# Patient Record
Sex: FEMALE | Race: WHITE | Employment: FULL TIME | URBAN - METROPOLITAN AREA
[De-identification: names, ages, dates, MRNs, and addresses within clinical notes are randomized per-mention and may not be internally consistent; named-entity substitution may affect disease eponyms.]

---

## 2018-04-28 ENCOUNTER — OFFICE VISIT (OUTPATIENT)
Dept: URGENT CARE | Facility: URGENT CARE | Age: 28
End: 2018-04-28
Payer: MEDICAID

## 2018-04-28 VITALS
HEART RATE: 65 BPM | OXYGEN SATURATION: 98 % | WEIGHT: 246 LBS | DIASTOLIC BLOOD PRESSURE: 70 MMHG | TEMPERATURE: 96.5 F | SYSTOLIC BLOOD PRESSURE: 122 MMHG

## 2018-04-28 DIAGNOSIS — M89.8X6 PAIN OF LEFT TIBIA: Primary | ICD-10-CM

## 2018-04-28 PROCEDURE — 99203 OFFICE O/P NEW LOW 30 MIN: CPT | Performed by: PHYSICIAN ASSISTANT

## 2018-04-28 NOTE — NURSING NOTE
Chief Complaint   Patient presents with     Urgent Care     Patient inured leg on exercise machine on 4/23-patient states her ankle was bruised and it is hard to put weight on ankle/leg. Patient also states that she feels numbness in the foot when standing on it and she also feels a bone might be out of place.       Initial /70  Pulse 65  Temp 96.5  F (35.8  C) (Tympanic)  Wt 246 lb (111.6 kg)  LMP 03/04/2018 (Approximate)  SpO2 98% There is no height or weight on file to calculate BMI.  Medication Reconciliation: incomplete   Caridad Laguerre CMA (AAMA)

## 2018-04-28 NOTE — PROGRESS NOTES
SUBJECTIVE:  Chief Complaint   Patient presents with     Urgent Care     Patient inured leg on exercise machine on 4/23-patient states her ankle was bruised and it is hard to put weight on ankle/leg. Patient also states that she feels numbness in the foot when standing on it and she also feels a bone might be out of place.     Yu Machado is a 27 year old female presents with a chief complaint of left ankle and shin pain.  The injury occurred 5 day(s) ago.   The injury happened while exercising. How: banged left shin immediate pain.  The patient complained of moderate pain  and has not had decreased ROM. Intensity of pain 6/10.  Pain exacerbated by walking, weight-bearing and movement.  Relieved by rest.  She treated it initially with ice and Tylenol. This is the first time this type of injury has occurred to this patient. History of chronic numbness to left foot and had back surgery where she has chronic pain. Still has lower back pain. Moved here from [place] but has not established yet.     No past medical history on file.  No current outpatient prescriptions on file.     Social History   Substance Use Topics     Smoking status: Not on file     Smokeless tobacco: Not on file     Alcohol use Not on file       ROS:  CONSTITUTIONAL:NEGATIVE for fever, chills, change in weight  MUSCULOSKELETAL: myalgia    EXAM:   /70  Pulse 65  Temp 96.5  F (35.8  C) (Tympanic)  Wt 246 lb (111.6 kg)  LMP 03/04/2018 (Approximate)  SpO2 98%  Gen: healthy,alert,no distress  Extremity: leg has point tenderness mid left shin.   There is not compromise to the distal circulation.  Pulses are +2 and CRT is brisk  GENERAL APPEARANCE: healthy, alert and no distress  EXTREMITIES: peripheral pulses normal  SKIN: ecchymoses - left mid shin  NEURO: Normal strength and tone, sensory exam grossly normal, mentation intact and speech normal    X-RAY was not done.    ASSESSMENT:     1. Pain of left tibia    PLAN:  1) Rest, Ice,  Compress, Elevate and acetaminophen as needed. Letter for work given. Follow up next week in clinic to establish.

## 2018-04-28 NOTE — MR AVS SNAPSHOT
"              After Visit Summary   2018    Yu Machado    MRN: [id number]           Patient Information     Date Of Birth          1990        Visit Information        Provider Department      2018 10:55 AM Ricky Olvera PA-C [facility] [place] [facility]        Today's Diagnoses     Pain of left tibia    -  1       Follow-ups after your visit        Who to contact     If you have questions or need follow up information about today's clinic visit or your schedule please contact [facility] directly at [phone].  Normal or non-critical lab and imaging results will be communicated to you by [employer], letter or phone within 4 business days after the clinic has received the results. If you do not hear from us within 7 days, please contact the clinic through [employer] or phone. If you have a critical or abnormal lab result, we will notify you by phone as soon as possible.  Submit refill requests through [employer] or call your pharmacy and they will forward the refill request to us. Please allow 3 business days for your refill to be completed.          Additional Information About Your Visit        MyChart Information     [employer] lets you send messages to your doctor, view your test results, renew your prescriptions, schedule appointments and more. To sign up, go to [email or web address] . Click on \"Log in\" on the left side of the screen, which will take you to the Welcome page. Then click on \"Sign up Now\" on the right side of the page.     You will be asked to enter the access code listed below, as well as some personal information. Please follow the directions to create your username and password.     Your access code is: [email or web address]  Expires: 2018 11:45 AM     Your access code will  in 90 days. If you need help or a new code, please call your [place] clinic or [phone].        Care EveryWhere ID     This is your Care EveryWhere ID. This could be used by other " organizations to access your [place] medical records  [id number]        Your Vitals Were     Pulse Temperature Last Period Pulse Oximetry          65 96.5  F (35.8  C) (Tympanic) 03/04/2018 (Approximate) 98%         Blood Pressure from Last 3 Encounters:   04/28/18 122/70    Weight from Last 3 Encounters:   04/28/18 246 lb (111.6 kg)              Today, you had the following     No orders found for display       Primary Care Provider Fax #    Physician No Ref-Primary [phone]       No address on file        Equal Access to Services     LITZY PORTER : Hadii aad ku hadasho Soomaali, waaxda luqadaha, qaybta kaalmada adeegyada, teodoro melissain hayfrancon blair jaimes . So [facility] [phone].    ATENCIÓN: Si habla español, tiene a mckeon disposición servicios gratuitos de asistencia lingüística. Llame al [phone].    We comply with applicable federal civil rights laws and [place] laws. We do not discriminate on the basis of race, color, national origin, age, disability, sex, sexual orientation, or gender identity.            Thank you!     Thank you for choosing [facility]  for your care. Our goal is always to provide you with excellent care. Hearing back from our patients is one way we can continue to improve our services. Please take a few minutes to complete the written survey that you may receive in the mail after your visit with us. Thank you!             Your Updated Medication List - Protect others around you: Learn how to safely use, store and throw away your medicines at www.disposemymeds.org.      Notice  As of 4/28/2018 11:45 AM    You have not been prescribed any medications.

## 2018-04-28 NOTE — LETTER
[facility]  [address]  [address]  [place] [address]  Phone: [phone]  Fax: [phone]    April 28, 2018        Yu D Machado  [address]  [place] [address]          To whom it may concern:    RE: Yu LEE Machado    Patient was seen and treated today at our clinic and missed work.    Please contact me for questions or concerns.      Sincerely,        Ricky Olvera PA-C

## 2018-05-14 ENCOUNTER — HOSPITAL ENCOUNTER (EMERGENCY)
Facility: CLINIC | Age: 28
Discharge: HOME | End: 2018-05-15
Attending: EMERGENCY MEDICINE | Admitting: EMERGENCY MEDICINE
Payer: MEDICAID

## 2018-05-14 DIAGNOSIS — R10.31 ABDOMINAL PAIN, RIGHT LOWER QUADRANT: ICD-10-CM

## 2018-05-14 LAB
ALBUMIN UR-MCNC: 30 MG/DL
APPEARANCE UR: CLEAR
BASOPHILS # BLD AUTO: 0.1 10E9/L (ref 0–0.2)
BASOPHILS NFR BLD AUTO: 0.7 %
BILIRUB UR QL STRIP: NEGATIVE
COLOR UR AUTO: YELLOW
DIFFERENTIAL METHOD BLD: NORMAL
EOSINOPHIL # BLD AUTO: 0.1 10E9/L (ref 0–0.7)
EOSINOPHIL NFR BLD AUTO: 1.5 %
ERYTHROCYTE [DISTWIDTH] IN BLOOD BY AUTOMATED COUNT: 12.9 % (ref 10–15)
GLUCOSE UR STRIP-MCNC: NEGATIVE MG/DL
HCG UR QL: NEGATIVE
HCT VFR BLD AUTO: 43.9 % (ref 35–47)
HGB BLD-MCNC: 14.4 G/DL (ref 11.7–15.7)
HGB UR QL STRIP: NEGATIVE
IMM GRANULOCYTES # BLD: 0 10E9/L (ref 0–0.4)
IMM GRANULOCYTES NFR BLD: 0.2 %
KETONES UR STRIP-MCNC: NEGATIVE MG/DL
LEUKOCYTE ESTERASE UR QL STRIP: ABNORMAL
LYMPHOCYTES # BLD AUTO: 2.7 10E9/L (ref 0.8–5.3)
LYMPHOCYTES NFR BLD AUTO: 29.8 %
MCH RBC QN AUTO: 28.6 PG (ref 26.5–33)
MCHC RBC AUTO-ENTMCNC: 32.8 G/DL (ref 31.5–36.5)
MCV RBC AUTO: 87 FL (ref 78–100)
MONOCYTES # BLD AUTO: 0.8 10E9/L (ref 0–1.3)
MONOCYTES NFR BLD AUTO: 8.9 %
MUCOUS THREADS #/AREA URNS LPF: PRESENT /LPF
NEUTROPHILS # BLD AUTO: 5.4 10E9/L (ref 1.6–8.3)
NEUTROPHILS NFR BLD AUTO: 58.9 %
NITRATE UR QL: NEGATIVE
NRBC # BLD AUTO: 0 10*3/UL
NRBC BLD AUTO-RTO: 0 /100
PH UR STRIP: 5 PH (ref 5–7)
PLATELET # BLD AUTO: 271 10E9/L (ref 150–450)
RBC # BLD AUTO: 5.03 10E12/L (ref 3.8–5.2)
RBC #/AREA URNS AUTO: 0 /HPF (ref 0–2)
SOURCE: ABNORMAL
SP GR UR STRIP: 1.02 (ref 1–1.03)
SQUAMOUS #/AREA URNS AUTO: 4 /HPF (ref 0–1)
UROBILINOGEN UR STRIP-MCNC: 0 MG/DL (ref 0–2)
WBC # BLD AUTO: 9.1 10E9/L (ref 4–11)
WBC #/AREA URNS AUTO: 1 /HPF (ref 0–5)

## 2018-05-14 PROCEDURE — 81025 URINE PREGNANCY TEST: CPT | Performed by: EMERGENCY MEDICINE

## 2018-05-14 PROCEDURE — 99285 EMERGENCY DEPT VISIT HI MDM: CPT | Mod: 25

## 2018-05-14 PROCEDURE — 81001 URINALYSIS AUTO W/SCOPE: CPT | Performed by: EMERGENCY MEDICINE

## 2018-05-14 PROCEDURE — 83690 ASSAY OF LIPASE: CPT | Performed by: EMERGENCY MEDICINE

## 2018-05-14 PROCEDURE — 96375 TX/PRO/DX INJ NEW DRUG ADDON: CPT

## 2018-05-14 PROCEDURE — 80053 COMPREHEN METABOLIC PANEL: CPT | Performed by: EMERGENCY MEDICINE

## 2018-05-14 PROCEDURE — 96376 TX/PRO/DX INJ SAME DRUG ADON: CPT

## 2018-05-14 PROCEDURE — 85025 COMPLETE CBC W/AUTO DIFF WBC: CPT | Performed by: EMERGENCY MEDICINE

## 2018-05-14 PROCEDURE — 96374 THER/PROPH/DIAG INJ IV PUSH: CPT

## 2018-05-14 PROCEDURE — 96361 HYDRATE IV INFUSION ADD-ON: CPT

## 2018-05-14 PROCEDURE — 25000128 H RX IP 250 OP 636: Performed by: EMERGENCY MEDICINE

## 2018-05-14 RX ORDER — MORPHINE SULFATE 4 MG/ML
4 INJECTION, SOLUTION INTRAMUSCULAR; INTRAVENOUS
Status: DISCONTINUED | OUTPATIENT
Start: 2018-05-14 | End: 2018-05-15 | Stop reason: HOSPADM

## 2018-05-14 RX ORDER — KETOROLAC TROMETHAMINE 15 MG/ML
15 INJECTION, SOLUTION INTRAMUSCULAR; INTRAVENOUS ONCE
Status: COMPLETED | OUTPATIENT
Start: 2018-05-14 | End: 2018-05-14

## 2018-05-14 RX ORDER — ONDANSETRON 2 MG/ML
4 INJECTION INTRAMUSCULAR; INTRAVENOUS
Status: COMPLETED | OUTPATIENT
Start: 2018-05-14 | End: 2018-05-14

## 2018-05-14 RX ADMIN — ONDANSETRON 4 MG: 2 INJECTION INTRAMUSCULAR; INTRAVENOUS at 23:29

## 2018-05-14 RX ADMIN — SODIUM CHLORIDE 1000 ML: 9 INJECTION, SOLUTION INTRAVENOUS at 23:32

## 2018-05-14 RX ADMIN — KETOROLAC TROMETHAMINE 15 MG: 15 INJECTION, SOLUTION INTRAMUSCULAR; INTRAVENOUS at 23:31

## 2018-05-14 RX ADMIN — MORPHINE SULFATE 4 MG: 4 INJECTION INTRAVENOUS at 23:34

## 2018-05-14 NOTE — ED AVS SNAPSHOT
[facility] Emergency Department    [address] [place] [address]    Phone:  [phone]    Fax:  [phone]                                       Yu Machado   MRN: [id number]    Department:  [facility] Emergency Department   Date of Visit:  5/14/2018           Patient Information     Date Of Birth          1990        Your diagnoses for this visit were:     Abdominal pain, right lower quadrant        You were seen by Jeremy Campa MD.      Follow-up Information     Follow up with PCP in 1-2 days for recheck.        Discharge Instructions       Discharge Instructions  Abdominal Pain    Abdominal pain (belly pain) can be caused by many things. Your evaluation today does not show the exact cause for your pain. Your provider today has decided that it is unlikely your pain is due to a life threatening problem, or a problem requiring surgery or hospital admission. Sometimes those problems cannot be found right away, so it is very important that you follow up as directed.  Sometimes only the changes which occur over time allow the cause of your pain to be found.    Generally, every Emergency Department visit should have a follow-up clinic visit with either a primary or a specialty clinic/provider. Please follow-up as instructed by your emergency provider today. With abdominal pain, we often recommend very close follow-up, such as the following day.    ADULTS:  Return to the Emergency Department right away if:      You get an oral temperature above 102oF or as directed by your provider.    You have blood in your stools. This may be bright red or appear as black, tarry stools.      You keep vomiting (throwing up) or cannot drink liquids.    You see blood when you vomit.     You cannot have a bowel movement or you cannot pass gas.    Your stomach gets bloated or bigger.    Your skin or the whites of your eyes look yellow.    You faint.    You have bloody, frequent  "or painful urination (peeing).    You have new symptoms or anything that worries you.    MORE INFORMATION:    Appendicitis:  A possible cause of abdominal pain in any person who still has their appendix is acute appendicitis. Appendicitis is often hard to diagnose.  Testing does not always rule out early appendicitis or other causes of abdominal pain. Close follow-up with your provider and re-evaluations may be needed to figure out the reason for your abdominal pain.    Follow-up:  It is very important that you make an appointment with your clinic and go to the appointment.  If you do not follow-up with your primary provider, it may result in missing an important development which could result in permanent injury or disability and/or lasting pain.  If there is any problem keeping your appointment, call your provider or return to the Emergency Department.    Medications:  Take your medications as directed by your provider today.  Before using over-the-counter medications, ask your provider and make sure to take the medications as directed.  If you have any questions about medications, ask your provider.    Diet:  Resume your normal diet as much as possible, but do not eat fried, fatty or spicy foods while you have pain.  Do not drink alcohol or have caffeine.  Do not smoke tobacco.    Probiotics: If you have been given an antibiotic, you may want to also take a probiotic pill or eat yogurt with live cultures. Probiotics have \"good bacteria\" to help your intestines stay healthy. Studies have shown that probiotics help prevent diarrhea (loose stools) and other intestine problems (including C. diff infection) when you take antibiotics. You can buy these without a prescription in the pharmacy section of the store.     If you were given a prescription for medicine here today, be sure to read all of the information (including the package insert) that comes with your prescription.  This will include important information " about the medicine, its side effects, and any warnings that you need to know about.  The pharmacist who fills the prescription can provide more information and answer questions you may have about the medicine.  If you have questions or concerns that the pharmacist cannot address, please call or return to the Emergency Department.       Remember that you can always come back to the Emergency Department if you are not able to see your regular provider in the amount of time listed above, if you get any new symptoms, or if there is anything that worries you.      24 Hour Appointment Hotline       To make an appointment at any [facility], call [phone] ([phone]). If you don't have a family doctor or clinic, we will help you find one. [facility] clinics are conveniently located to serve the needs of you and your family.             Review of your medicines      Notice     You have not been prescribed any medications.            Procedures and tests performed during your visit     CBC with platelets differential    Comprehensive metabolic panel    HCG qualitative urine    Lipase    UA with Microscopic    US Pelvic Complete w Transvaginal & Abd/Pel Duplex Limited      Orders Needing Specimen Collection     None      Pending Results     No orders found for last 3 day(s).            Pending Culture Results     No orders found for last 3 day(s).            Pending Results Instructions     If you had any lab results that were not finalized at the time of your Discharge, you can call the ED Lab Result RN at [phone]. You will be contacted by this team for any positive Lab results or changes in treatment. The nurses are available 7 days a week from 10A to 6:30P.  You can leave a message 24 hours per day and they will return your call.        Test Results From Your Hospital Stay        5/14/2018 11:49 PM      Component Results     Component Value Ref Range & Units Status    HCG Qual Urine Negative NEG^Negative  Final    This test is for screening purposes.  Results should be interpreted along with   the clinical picture.  Confirmation testing is available if warranted by   ordering [id number], HCG Quantitative Pregnancy.           5/14/2018 11:38 PM      Component Results     Component Value Ref Range & Units Status    WBC 9.1 4.0 - 11.0 10e9/L Final    RBC Count 5.03 3.8 - 5.2 10e12/L Final    Hemoglobin 14.4 11.7 - 15.7 g/dL Final    Hematocrit 43.9 35.0 - 47.0 % Final    MCV 87 78 - 100 fl Final    MCH 28.6 26.5 - 33.0 pg Final    MCHC 32.8 31.5 - 36.5 g/dL Final    RDW 12.9 10.0 - 15.0 % Final    Platelet Count 271 150 - 450 10e9/L Final    Diff Method Automated Method  Final    % Neutrophils 58.9 % Final    % Lymphocytes 29.8 % Final    % Monocytes 8.9 % Final    % Eosinophils 1.5 % Final    % Basophils 0.7 % Final    % Immature Granulocytes 0.2 % Final    Nucleated RBCs 0 0 /100 Final    Absolute Neutrophil 5.4 1.6 - 8.3 10e9/L Final    Absolute Lymphocytes 2.7 0.8 - 5.3 10e9/L Final    Absolute Monocytes 0.8 0.0 - 1.3 10e9/L Final    Absolute Eosinophils 0.1 0.0 - 0.7 10e9/L Final    Absolute Basophils 0.1 0.0 - 0.2 10e9/L Final    Abs Immature Granulocytes 0.0 0 - 0.4 10e9/L Final    Absolute Nucleated RBC 0.0  Final         5/15/2018 12:03 AM      Component Results     Component Value Ref Range & Units Status    Sodium 136 133 - 144 mmol/L Final    Potassium 4.5 3.4 - 5.3 mmol/L Final    Specimen slightly hemolyzed, potassium may be falsely elevated    Chloride 106 94 - 109 mmol/L Final    Carbon Dioxide 25 20 - 32 mmol/L Final    Anion Gap 5 3 - 14 mmol/L Final    Glucose 87 70 - 99 mg/dL Final    Urea Nitrogen 15 7 - 30 mg/dL Final    Creatinine 0.70 0.52 - 1.04 mg/dL Final    GFR Estimate >90 >60 mL/min/1.7m2 Final    Non  GFR Calc    GFR Estimate If Black >90 >60 mL/min/1.7m2 Final    African American GFR Calc    Calcium 9.6 8.5 - 10.1 mg/dL Final    Bilirubin Total 0.5 0.2 - 1.3 mg/dL Final     Albumin 4.1 3.4 - 5.0 g/dL Final    Protein Total 8.6 6.8 - 8.8 g/dL Final    Alkaline Phosphatase 59 40 - 150 U/L Final    ALT 46 0 - 50 U/L Final    AST 31 0 - 45 U/L Final    Specimen is hemolyzed which can falsely elevate AST. Analysis of a   non-hemolyzed specimen may result in a lower value.           5/15/2018 12:03 AM      Component Results     Component Value Ref Range & Units Status    Lipase 116 73 - 393 U/L Final         5/14/2018 11:52 PM      Component Results     Component Value Ref Range & Units Status    Color Urine Yellow  Final    Appearance Urine Clear  Final    Glucose Urine Negative NEG^Negative mg/dL Final    Bilirubin Urine Negative NEG^Negative Final    Ketones Urine Negative NEG^Negative mg/dL Final    Specific Gravity Urine 1.023 1.003 - 1.035 Final    Blood Urine Negative NEG^Negative Final    pH Urine 5.0 5.0 - 7.0 pH Final    Protein Albumin Urine 30 (A) NEG^Negative mg/dL Final    Urobilinogen mg/dL 0.0 0.0 - 2.0 mg/dL Final    Nitrite Urine Negative NEG^Negative Final    Leukocyte Esterase Urine Trace (A) NEG^Negative Final    Source Midstream Urine  Final    WBC Urine 1 0 - 5 /HPF Final    RBC Urine 0 0 - 2 /HPF Final    Squamous Epithelial /HPF Urine 4 (H) 0 - 1 /HPF Final    Mucous Urine Present (A) NEG^Negative /LPF Final         5/15/2018  1:28 AM      Narrative     US PELVIS COMPLETE W TRANSVAGINAL AND DOPPLER LIMITED  5/15/2018 1:02  AM     INDICATION: Pelvic pain - right.    COMPARISON: None.    FINDINGS: Transabdominal followed by endovaginal ultrasound to better  evaluate ovaries. Uterus measures 6.6 x 3.5 x 4.9 cm. Endometrial  stripe measures 0.7 cm in thickness. No uterine mass. Both ovaries are  identified and within normal limits. No suspicious ovarian or adnexal  masses. Doppler color and waveform analysis demonstrates blood flow to  both ovaries. Trace free fluid. Otherwise negative.        Impression     IMPRESSION: Trace pelvic fluid, within normal physiologic  limits.  Otherwise negative.    GIOVANNI MARTE MD                Clinical Quality Measure: Blood Pressure Screening     Your blood pressure was checked while you were in the emergency department today. The last reading we obtained was  BP: 110/75 . Please read the guidelines below about what these numbers mean and what you should do about them.  If your systolic blood pressure (the top number) is less than 120 and your diastolic blood pressure (the bottom number) is less than 80, then your blood pressure is normal. There is nothing more that you need to do about it.  If your systolic blood pressure (the top number) is 120-139 or your diastolic blood pressure (the bottom number) is 80-89, your blood pressure may be higher than it should be. You should have your blood pressure rechecked within a year by a primary care provider.  If your systolic blood pressure (the top number) is 140 or greater or your diastolic blood pressure (the bottom number) is 90 or greater, you may have high blood pressure. High blood pressure is treatable, but if left untreated over time it can put you at risk for heart attack, stroke, or kidney failure. You should have your blood pressure rechecked by a primary care provider within the next 4 weeks.  If your provider in the emergency department today gave you specific instructions to follow-up with your doctor or provider even sooner than that, you should follow that instruction and not wait for up to 4 weeks for your follow-up visit.        Thank you for choosing [facility]       Thank you for choosing [facility] for your care. Our goal is always to provide you with excellent care. Hearing back from our patients is one way we can continue to improve our services. Please take a few minutes to complete the written survey that you may receive in the mail after you visit with us. Thank you!        "[employer]hart Information     [employer] lets you send messages to your doctor, view your test results, renew  "your prescriptions, schedule appointments and more. To sign up, go to [email or web address] . Click on \"Log in\" on the left side of the screen, which will take you to the Welcome page. Then click on \"Sign up Now\" on the right side of the page.     You will be asked to enter the access code listed below, as well as some personal information. Please follow the directions to create your username and password.     Your access code is: [email or web address]  Expires: 2018 11:45 AM     Your access code will  in 90 days. If you need help or a new code, please call your [place] clinic or [phone].        Care EveryWhere ID     This is your Care EveryWhere ID. This could be used by other organizations to access your [place] medical records  [id number]        Equal Access to Services     LITZY PORTER : Hadmanish Lazar, gloria ruthefrord, sammy menendez, teodoro jaimes . So [facility] [phone].    ATENCIÓN: Si habla español, tiene a mckeon disposición servicios gratuitos de asistencia lingüística. Llame al [phone].    We comply with applicable federal civil rights laws and [place] laws. We do not discriminate on the basis of race, color, national origin, age, disability, sex, sexual orientation, or gender identity.            After Visit Summary       This is your record. Keep this with you and show to your community pharmacist(s) and doctor(s) at your next visit.                  "

## 2018-05-14 NOTE — LETTER
May 15, 2018      To Whom It May Concern:      Yu Machado was seen in our Emergency Department today, 05/15/18.  I expect her condition to improve over the next 2-3 days.  She may return to work when improved.    Sincerely,        Ramone West RN

## 2018-05-14 NOTE — ED AVS SNAPSHOT
[facility] Emergency Department    [address]    [facility] [address]    Phone:  [phone]    Fax:  [phone]                                       Yu Machado   MRN: [id number]    Department:  [facility] Emergency Department   Date of Visit:  5/14/2018           After Visit Summary Signature Page     I have received my discharge instructions, and my questions have been answered. I have discussed any challenges I see with this plan with the nurse or doctor.    ..........................................................................................................................................  Patient/Patient Representative Signature      ..........................................................................................................................................  Patient Representative Print Name and Relationship to Patient    ..................................................               ................................................  Date                                            Time    ..........................................................................................................................................  Reviewed by Signature/Title    ...................................................              ..............................................  Date                                                            Time

## 2018-05-15 ENCOUNTER — APPOINTMENT (OUTPATIENT)
Dept: ULTRASOUND IMAGING | Facility: CLINIC | Age: 28
End: 2018-05-15
Attending: EMERGENCY MEDICINE
Payer: MEDICAID

## 2018-05-15 VITALS
HEART RATE: 65 BPM | RESPIRATION RATE: 16 BRPM | OXYGEN SATURATION: 96 % | HEIGHT: 63 IN | SYSTOLIC BLOOD PRESSURE: 112 MMHG | BODY MASS INDEX: 42.17 KG/M2 | TEMPERATURE: 97.9 F | DIASTOLIC BLOOD PRESSURE: 76 MMHG | WEIGHT: 238 LBS

## 2018-05-15 LAB
ALBUMIN SERPL-MCNC: 4.1 G/DL (ref 3.4–5)
ALP SERPL-CCNC: 59 U/L (ref 40–150)
ALT SERPL W P-5'-P-CCNC: 46 U/L (ref 0–50)
ANION GAP SERPL CALCULATED.3IONS-SCNC: 5 MMOL/L (ref 3–14)
AST SERPL W P-5'-P-CCNC: 31 U/L (ref 0–45)
BILIRUB SERPL-MCNC: 0.5 MG/DL (ref 0.2–1.3)
BUN SERPL-MCNC: 15 MG/DL (ref 7–30)
CALCIUM SERPL-MCNC: 9.6 MG/DL (ref 8.5–10.1)
CHLORIDE SERPL-SCNC: 106 MMOL/L (ref 94–109)
CO2 SERPL-SCNC: 25 MMOL/L (ref 20–32)
CREAT SERPL-MCNC: 0.7 MG/DL (ref 0.52–1.04)
GFR SERPL CREATININE-BSD FRML MDRD: >90 ML/MIN/1.7M2
GLUCOSE SERPL-MCNC: 87 MG/DL (ref 70–99)
LIPASE SERPL-CCNC: 116 U/L (ref 73–393)
POTASSIUM SERPL-SCNC: 4.5 MMOL/L (ref 3.4–5.3)
PROT SERPL-MCNC: 8.6 G/DL (ref 6.8–8.8)
SODIUM SERPL-SCNC: 136 MMOL/L (ref 133–144)

## 2018-05-15 PROCEDURE — 93976 VASCULAR STUDY: CPT | Mod: XS

## 2018-05-15 PROCEDURE — 25000128 H RX IP 250 OP 636: Performed by: EMERGENCY MEDICINE

## 2018-05-15 RX ADMIN — MORPHINE SULFATE 4 MG: 4 INJECTION INTRAVENOUS at 00:24

## 2018-05-15 NOTE — ED TRIAGE NOTES
27-year-old female presents to the ER with complaints of right sided abd pain. Pt states the pain started yesterday on the right side and has now wrapped around to the front. Pt is also having diarrhea. No vomiting however has no appetite.

## 2018-05-15 NOTE — ED PROVIDER NOTES
Visit Date:   05/14/2018      CHIEF COMPLAINT:  Abdominal pain.      HISTORY OF PRESENT ILLNESS:  This is a pleasant 27-year-old female who presents with abdominal pain.  It has been present for the last 24 hours.  It was initially sudden in onset, but has persisted.  The pain is worse with movement and she is having a hard time sleeping.  She has had some diarrhea with this.  No blood in the stool.  No fevers or vomiting.  No chest pain or shortness of breath.  She denies any urinary symptoms or vaginal discharge.  Her last menstrual period was in March but it is not uncommon for her to have very irregular periods and a long time in between them.  She does not think she is pregnant at this time.  She also notes a decreased appetite.      PAST MEDICAL HISTORY:   1.  Obesity.   2.  Chronic low back pain.      PAST SURGICAL HISTORY:    1.  Lumbar back surgery.    2.  Cholecystectomy.      MEDICATIONS:  None.      ALLERGIES:  NONE.      SOCIAL HISTORY:  The patient presents to the emergency department with a male .  She is a nonsmoker.      REVIEW OF SYSTEMS:  Pertinent 10-point review of systems is negative except for that noted in the HPI.      PHYSICAL EXAMINATION:   GENERAL:  The patient is resting in bed.  She is holding her right side of her abdomen but is otherwise comfortable appearing.   VITAL SIGNS:  Blood pressure 125/107.  Repeat blood pressure is 110/75.  Heart rate 77.  Repeat heart rate 52.  Respiratory rate 16, oxygen 100% on room air, temperature 97.9 degrees orally.   HEENT:  Atraumatic.  Moist mucous membranes.   NECK:  Full range of motion.   CARDIOVASCULAR:  Regular rhythm.  Normal rate.  No murmurs.   RESPIRATORY:  Clear to auscultation bilaterally without wheezes, rales or rhonchi.   GASTROINTESTINAL:  Soft, nondistended, increased BMI does limit exam somewhat.  She has diffuse non-localizing tenderness on the right side of her abdomen.  No guarding or rigidity.  Negative Clark's sign.   Negative Rovsing sign.   NEUROLOGIC:  The patient is alert and oriented x 4.   PSYCHIATRIC:  The patient has a normal mood and affect.   SKIN:  There are no pertinent skin findings.      LABORATORY AND DIAGNOSTIC STUDIES:    Urine pregnancy test is negative.    Urinalysis unremarkable.    CBC is normal with a white blood cell count of 9.1.  Comprehensive metabolic panel and lipase are within normal limits.      IMAGING:  Transvaginal pelvic ultrasound with Doppler shows trace pelvic fluid within normal physiological limits. Otherwise, negative.  This was interpreted by Radiology.      EMERGENCY DEPARTMENT COURSE AND MEDICAL DECISION MAKING:  This is a 27-year-old female who presents with 24 hours of right-sided abdominal pain and several episodes of diarrhea.  She has a nonsurgical abdomen, but does have tenderness on the right side.  At this time, we have not found any etiology to her pain.  Ultrasound was unremarkable.  Clinical suspicion for ovarian torsion is low. She did receive Zofran, IV fluids, Toradol and morphine here in the emergency department.  She still has some pain, but looks clinically improved.  I have discussed with her at length obtaining a CT scan as I have not formally ruled out appendicitis.  Other etiologies of abdominal plain could be identified using this imaging such as bowel obstruction, perforation or other potentially surgical issues.  I have a low suspicion for this and the patient would like to forego the CT.  She wants to avoid irradiation.  This is very reasonable choice. Return precautions discussed should her symptoms be worsening in any way.  Otherwise, she will be discharged home to the emergency department with supportive care instructions advised.        DIAGNOSIS:  Right lower quadrant abdominal pain.      PLAN OF CARE:  Discharge home, close primary care followup in 1-2 days for recheck.         LJ GILLESPIE MD             D: 05/15/2018   T: 05/15/2018   MT: MARGARITA      Name:      ED ESCALERA   MRN:      [id number]        Account:      [id number]   :      1990           Visit Date:   2018      Document: [id number]

## 2018-05-21 ENCOUNTER — HOSPITAL ENCOUNTER (EMERGENCY)
Facility: CLINIC | Age: 28
Discharge: HOME | End: 2018-05-21
Attending: EMERGENCY MEDICINE | Admitting: EMERGENCY MEDICINE
Payer: MEDICAID

## 2018-05-21 ENCOUNTER — OFFICE VISIT (OUTPATIENT)
Dept: FAMILY MEDICINE | Facility: CLINIC | Age: 28
End: 2018-05-21
Payer: MEDICAID

## 2018-05-21 ENCOUNTER — APPOINTMENT (OUTPATIENT)
Dept: CT IMAGING | Facility: CLINIC | Age: 28
End: 2018-05-21
Attending: EMERGENCY MEDICINE
Payer: MEDICAID

## 2018-05-21 VITALS
WEIGHT: 248 LBS | HEIGHT: 63 IN | BODY MASS INDEX: 43.94 KG/M2 | RESPIRATION RATE: 16 BRPM | DIASTOLIC BLOOD PRESSURE: 85 MMHG | TEMPERATURE: 96 F | SYSTOLIC BLOOD PRESSURE: 135 MMHG | OXYGEN SATURATION: 96 %

## 2018-05-21 VITALS
SYSTOLIC BLOOD PRESSURE: 122 MMHG | DIASTOLIC BLOOD PRESSURE: 88 MMHG | HEIGHT: 63 IN | OXYGEN SATURATION: 98 % | BODY MASS INDEX: 43.05 KG/M2 | TEMPERATURE: 97.1 F | RESPIRATION RATE: 18 BRPM | HEART RATE: 75 BPM | WEIGHT: 243 LBS

## 2018-05-21 DIAGNOSIS — R10.31 ABDOMINAL PAIN, RIGHT LOWER QUADRANT: ICD-10-CM

## 2018-05-21 DIAGNOSIS — F51.01 PRIMARY INSOMNIA: ICD-10-CM

## 2018-05-21 DIAGNOSIS — R10.31 ABDOMINAL PAIN, RIGHT LOWER QUADRANT: Primary | ICD-10-CM

## 2018-05-21 DIAGNOSIS — F43.10 PTSD (POST-TRAUMATIC STRESS DISORDER): ICD-10-CM

## 2018-05-21 DIAGNOSIS — F41.0 ANXIETY ATTACK: ICD-10-CM

## 2018-05-21 LAB
ALBUMIN SERPL-MCNC: 3.7 G/DL (ref 3.4–5)
ALBUMIN UR-MCNC: 10 MG/DL
ALP SERPL-CCNC: 56 U/L (ref 40–150)
ALT SERPL W P-5'-P-CCNC: 36 U/L (ref 0–50)
AMORPH CRY #/AREA URNS HPF: ABNORMAL /HPF
ANION GAP SERPL CALCULATED.3IONS-SCNC: 7 MMOL/L (ref 3–14)
APPEARANCE UR: ABNORMAL
AST SERPL W P-5'-P-CCNC: 14 U/L (ref 0–45)
BASOPHILS # BLD AUTO: 0 10E9/L (ref 0–0.2)
BASOPHILS NFR BLD AUTO: 0.5 %
BILIRUB SERPL-MCNC: 0.2 MG/DL (ref 0.2–1.3)
BILIRUB UR QL STRIP: NEGATIVE
BUN SERPL-MCNC: 17 MG/DL (ref 7–30)
CALCIUM SERPL-MCNC: 8.7 MG/DL (ref 8.5–10.1)
CHLORIDE SERPL-SCNC: 109 MMOL/L (ref 94–109)
CO2 SERPL-SCNC: 26 MMOL/L (ref 20–32)
COLOR UR AUTO: YELLOW
CREAT SERPL-MCNC: 0.72 MG/DL (ref 0.52–1.04)
DIFFERENTIAL METHOD BLD: NORMAL
EOSINOPHIL # BLD AUTO: 0.1 10E9/L (ref 0–0.7)
EOSINOPHIL NFR BLD AUTO: 1.7 %
ERYTHROCYTE [DISTWIDTH] IN BLOOD BY AUTOMATED COUNT: 12.8 % (ref 10–15)
GFR SERPL CREATININE-BSD FRML MDRD: >90 ML/MIN/1.7M2
GLUCOSE SERPL-MCNC: 107 MG/DL (ref 70–99)
GLUCOSE UR STRIP-MCNC: NEGATIVE MG/DL
HCG UR QL: NEGATIVE
HCT VFR BLD AUTO: 40.7 % (ref 35–47)
HGB BLD-MCNC: 13.3 G/DL (ref 11.7–15.7)
HGB UR QL STRIP: NEGATIVE
IMM GRANULOCYTES # BLD: 0 10E9/L (ref 0–0.4)
IMM GRANULOCYTES NFR BLD: 0.3 %
KETONES UR STRIP-MCNC: NEGATIVE MG/DL
LEUKOCYTE ESTERASE UR QL STRIP: ABNORMAL
LYMPHOCYTES # BLD AUTO: 2.2 10E9/L (ref 0.8–5.3)
LYMPHOCYTES NFR BLD AUTO: 29.2 %
MCH RBC QN AUTO: 28.2 PG (ref 26.5–33)
MCHC RBC AUTO-ENTMCNC: 32.7 G/DL (ref 31.5–36.5)
MCV RBC AUTO: 86 FL (ref 78–100)
MONOCYTES # BLD AUTO: 0.5 10E9/L (ref 0–1.3)
MONOCYTES NFR BLD AUTO: 6.5 %
MUCOUS THREADS #/AREA URNS LPF: PRESENT /LPF
NEUTROPHILS # BLD AUTO: 4.6 10E9/L (ref 1.6–8.3)
NEUTROPHILS NFR BLD AUTO: 61.8 %
NITRATE UR QL: NEGATIVE
NRBC # BLD AUTO: 0 10*3/UL
NRBC BLD AUTO-RTO: 0 /100
PH UR STRIP: 5.5 PH (ref 5–7)
PLATELET # BLD AUTO: 240 10E9/L (ref 150–450)
POTASSIUM SERPL-SCNC: 3.9 MMOL/L (ref 3.4–5.3)
PROT SERPL-MCNC: 7.9 G/DL (ref 6.8–8.8)
RBC # BLD AUTO: 4.72 10E12/L (ref 3.8–5.2)
RBC #/AREA URNS AUTO: 2 /HPF (ref 0–2)
SODIUM SERPL-SCNC: 142 MMOL/L (ref 133–144)
SOURCE: ABNORMAL
SP GR UR STRIP: 1.02 (ref 1–1.03)
SQUAMOUS #/AREA URNS AUTO: 10 /HPF (ref 0–1)
UROBILINOGEN UR STRIP-MCNC: NORMAL MG/DL (ref 0–2)
WBC # BLD AUTO: 7.5 10E9/L (ref 4–11)
WBC #/AREA URNS AUTO: 2 /HPF (ref 0–5)

## 2018-05-21 PROCEDURE — 96375 TX/PRO/DX INJ NEW DRUG ADDON: CPT

## 2018-05-21 PROCEDURE — 85025 COMPLETE CBC W/AUTO DIFF WBC: CPT | Performed by: EMERGENCY MEDICINE

## 2018-05-21 PROCEDURE — 25000128 H RX IP 250 OP 636: Performed by: EMERGENCY MEDICINE

## 2018-05-21 PROCEDURE — 81025 URINE PREGNANCY TEST: CPT | Performed by: EMERGENCY MEDICINE

## 2018-05-21 PROCEDURE — 80053 COMPREHEN METABOLIC PANEL: CPT | Performed by: EMERGENCY MEDICINE

## 2018-05-21 PROCEDURE — 74177 CT ABD & PELVIS W/CONTRAST: CPT

## 2018-05-21 PROCEDURE — 96374 THER/PROPH/DIAG INJ IV PUSH: CPT | Mod: 59

## 2018-05-21 PROCEDURE — 96361 HYDRATE IV INFUSION ADD-ON: CPT

## 2018-05-21 PROCEDURE — 81001 URINALYSIS AUTO W/SCOPE: CPT | Performed by: EMERGENCY MEDICINE

## 2018-05-21 PROCEDURE — 25000125 ZZHC RX 250: Performed by: EMERGENCY MEDICINE

## 2018-05-21 PROCEDURE — 99214 OFFICE O/P EST MOD 30 MIN: CPT | Performed by: INTERNAL MEDICINE

## 2018-05-21 PROCEDURE — 99285 EMERGENCY DEPT VISIT HI MDM: CPT | Mod: 25

## 2018-05-21 RX ORDER — LORAZEPAM 0.5 MG/1
0.5 TABLET ORAL
Qty: 20 TABLET | Refills: 0 | Status: SHIPPED | OUTPATIENT
Start: 2018-05-21 | End: 2018-07-17

## 2018-05-21 RX ORDER — ONDANSETRON 2 MG/ML
4 INJECTION INTRAMUSCULAR; INTRAVENOUS ONCE
Status: COMPLETED | OUTPATIENT
Start: 2018-05-21 | End: 2018-05-21

## 2018-05-21 RX ORDER — KETOROLAC TROMETHAMINE 15 MG/ML
15 INJECTION, SOLUTION INTRAMUSCULAR; INTRAVENOUS ONCE
Status: COMPLETED | OUTPATIENT
Start: 2018-05-21 | End: 2018-05-21

## 2018-05-21 RX ORDER — IOPAMIDOL 755 MG/ML
124 INJECTION, SOLUTION INTRAVASCULAR ONCE
Status: COMPLETED | OUTPATIENT
Start: 2018-05-21 | End: 2018-05-21

## 2018-05-21 RX ORDER — MORPHINE SULFATE 4 MG/ML
4 INJECTION, SOLUTION INTRAMUSCULAR; INTRAVENOUS ONCE
Status: COMPLETED | OUTPATIENT
Start: 2018-05-21 | End: 2018-05-21

## 2018-05-21 RX ADMIN — MORPHINE SULFATE 4 MG: 4 INJECTION INTRAVENOUS at 18:03

## 2018-05-21 RX ADMIN — ONDANSETRON 4 MG: 2 INJECTION INTRAMUSCULAR; INTRAVENOUS at 18:04

## 2018-05-21 RX ADMIN — KETOROLAC TROMETHAMINE 15 MG: 15 INJECTION, SOLUTION INTRAMUSCULAR; INTRAVENOUS at 20:21

## 2018-05-21 RX ADMIN — SODIUM CHLORIDE 1000 ML: 9 INJECTION, SOLUTION INTRAVENOUS at 18:03

## 2018-05-21 RX ADMIN — SODIUM CHLORIDE 78 ML: 9 INJECTION, SOLUTION INTRAVENOUS at 18:24

## 2018-05-21 RX ADMIN — IOPAMIDOL 124 ML: 755 INJECTION, SOLUTION INTRAVENOUS at 18:22

## 2018-05-21 ASSESSMENT — ENCOUNTER SYMPTOMS
DYSURIA: 0
FEVER: 0
VOMITING: 0
NAUSEA: 0
ABDOMINAL PAIN: 1

## 2018-05-21 ASSESSMENT — PAIN SCALES - GENERAL: PAINLEVEL: SEVERE PAIN (7)

## 2018-05-21 NOTE — ED AVS SNAPSHOT
Emergency Department    [address] [place] [address]    Phone:  [phone]    Fax:  [phone]                                       Yu Machado   MRN: [id number]    Department:   Emergency Department   Date of Visit:  5/21/2018           After Visit Summary Signature Page     I have received my discharge instructions, and my questions have been answered. I have discussed any challenges I see with this plan with the nurse or doctor.    ..........................................................................................................................................  Patient/Patient Representative Signature      ..........................................................................................................................................  Patient Representative Print Name and Relationship to Patient    ..................................................               ................................................  Date                                            Time    ..........................................................................................................................................  Reviewed by Signature/Title    ...................................................              ..............................................  Date                                                            Time

## 2018-05-21 NOTE — ED AVS SNAPSHOT
Emergency Department    [address] [facility] [address]    Phone:  [phone]    Fax:  [phone]                                       Yu Machado   MRN: [id number]    Department:   Emergency Department   Date of Visit:  5/21/2018           Patient Information     Date Of Birth          1990        Your diagnoses for this visit were:     Abdominal pain, right lower quadrant        You were seen by Mayra Hanna MD.      Follow-up Information     Follow up with No Ref-Primary, Physician.        Follow up with  Emergency Department.    Specialty:  EMERGENCY MEDICINE    Why:  If symptoms worsen    Contact information:    [address] [facility] [address] [phone]        Discharge Instructions       Follow up with primary care provider      Discharge Instructions  Abdominal Pain    Abdominal pain (belly pain) can be caused by many things. Your evaluation today does not show the exact cause for your pain. Your provider today has decided that it is unlikely your pain is due to a life threatening problem, or a problem requiring surgery or hospital admission. Sometimes those problems cannot be found right away, so it is very important that you follow up as directed.  Sometimes only the changes which occur over time allow the cause of your pain to be found.    Generally, every Emergency Department visit should have a follow-up clinic visit with either a primary or a specialty clinic/provider. Please follow-up as instructed by your emergency provider today. With abdominal pain, we often recommend very close follow-up, such as the following day.    ADULTS:  Return to the Emergency Department right away if:      You get an oral temperature above 102oF or as directed by your provider.    You have blood in your stools. This may be bright red or appear as black, tarry stools.      You keep vomiting (throwing up) or cannot drink liquids.    You see blood when  you vomit.     You cannot have a bowel movement or you cannot pass gas.    Your stomach gets bloated or bigger.    Your skin or the whites of your eyes look yellow.    You faint.    You have bloody, frequent or painful urination (peeing).    You have new symptoms or anything that worries you.    CHILDREN:  Return to the Emergency Department right away if your child has any of the above-listed symptoms or the following:      Pushes your hand away or screams/cries when his/her belly is touched.    You notice your child is very fussy or weak.    Your child is very tired and is too tired to eat or drink.    Your child is dehydrated.  Signs of dehydration can be:  o Significant change in the amount of wet diapers/urine.  o Your infant or child starts to have dry mouth and lips, or no saliva (spit) or tears.    PREGNANT WOMEN:  Return to the Emergency Department right away if you have any of the above-listed symptoms or the following:      You have bleeding, leaking fluid or passing tissue from the vagina.    You have worse pain or cramping, or pain in your shoulder or back.    You have vomiting that will not stop.    You have a temperature of 100oF or more.    Your baby is not moving as much as usual.    You faint.    You get a bad headache with or without eye problems and abdominal pain.    You have a seizure.    You have unusual discharge from your vagina and abdominal pain.    Abdominal pain is pretty common during pregnancy.  Your pain may or may not be related to your pregnancy. You should follow-up closely with your OB provider so they can evaluate you and your baby.  Until you follow-up with your regular provider, do the following:       Avoid sex and do not put anything in your vagina.    Drink clear fluids.    Only take medications approved by your provider.    MORE INFORMATION:    Appendicitis:  A possible cause of abdominal pain in any person who still has their appendix is acute appendicitis. Appendicitis is  "often hard to diagnose.  Testing does not always rule out early appendicitis or other causes of abdominal pain. Close follow-up with your provider and re-evaluations may be needed to figure out the reason for your abdominal pain.    Follow-up:  It is very important that you make an appointment with your clinic and go to the appointment.  If you do not follow-up with your primary provider, it may result in missing an important development which could result in permanent injury or disability and/or lasting pain.  If there is any problem keeping your appointment, call your provider or return to the Emergency Department.    Medications:  Take your medications as directed by your provider today.  Before using over-the-counter medications, ask your provider and make sure to take the medications as directed.  If you have any questions about medications, ask your provider.    Diet:  Resume your normal diet as much as possible, but do not eat fried, fatty or spicy foods while you have pain.  Do not drink alcohol or have caffeine.  Do not smoke tobacco.    Probiotics: If you have been given an antibiotic, you may want to also take a probiotic pill or eat yogurt with live cultures. Probiotics have \"good bacteria\" to help your intestines stay healthy. Studies have shown that probiotics help prevent diarrhea (loose stools) and other intestine problems (including C. diff infection) when you take antibiotics. You can buy these without a prescription in the pharmacy section of the store.     If you were given a prescription for medicine here today, be sure to read all of the information (including the package insert) that comes with your prescription.  This will include important information about the medicine, its side effects, and any warnings that you need to know about.  The pharmacist who fills the prescription can provide more information and answer questions you may have about the medicine.  If you have questions or concerns " that the pharmacist cannot address, please call or return to the Emergency Department.       Remember that you can always come back to the Emergency Department if you are not able to see your regular provider in the amount of time listed above, if you get any new symptoms, or if there is anything that worries you.      24 Hour Appointment Hotline       To make an appointment at any [facility], call [phone] ([phone]). If you don't have a family doctor or clinic, we will help you find one. [facility] are conveniently located to serve the needs of you and your family.             Review of your medicines      Our records show that you are taking the medicines listed below. If these are incorrect, please call your family doctor or clinic.        Dose / Directions Last dose taken    LORazepam 0.5 MG tablet   Commonly known as:  ATIVAN   Dose:  0.5 mg   Quantity:  20 tablet        Take 1 tablet (0.5 mg) by mouth nightly as needed for anxiety   Refills:  0                Procedures and tests performed during your visit     CBC with platelets differential    CT Abdomen Pelvis w Contrast    Comprehensive metabolic panel    HCG qualitative urine    UA reflex to Microscopic      Orders Needing Specimen Collection     None      Pending Results     Date and Time Order Name Status Description    5/21/2018 1808 CT Abdomen Pelvis w Contrast Preliminary             Pending Culture Results     No orders found from 5/19/2018 to 5/22/2018.            Pending Results Instructions     If you had any lab results that were not finalized at the time of your Discharge, you can call the ED Lab Result RN at [phone]. You will be contacted by this team for any positive Lab results or changes in treatment. The nurses are available 7 days a week from 10A to 6:30P.  You can leave a message 24 hours per day and they will return your call.        Test Results From Your Hospital Stay        5/21/2018  6:07 PM       Component Results     Component Value Ref Range & Units Status    HCG Qual Urine Negative NEG^Negative Final    This test is for screening purposes.  Results should be interpreted along with   the clinical picture.  Confirmation testing is available if warranted by   ordering ITM049, HCG Quantitative Pregnancy.           5/21/2018  6:16 PM      Component Results     Component Value Ref Range & Units Status    WBC 7.5 4.0 - 11.0 10e9/L Final    RBC Count 4.72 3.8 - 5.2 10e12/L Final    Hemoglobin 13.3 11.7 - 15.7 g/dL Final    Hematocrit 40.7 35.0 - 47.0 % Final    MCV 86 78 - 100 fl Final    MCH 28.2 26.5 - 33.0 pg Final    MCHC 32.7 31.5 - 36.5 g/dL Final    RDW 12.8 10.0 - 15.0 % Final    Platelet Count 240 150 - 450 10e9/L Final    Diff Method Automated Method  Final    % Neutrophils 61.8 % Final    % Lymphocytes 29.2 % Final    % Monocytes 6.5 % Final    % Eosinophils 1.7 % Final    % Basophils 0.5 % Final    % Immature Granulocytes 0.3 % Final    Nucleated RBCs 0 0 /100 Final    Absolute Neutrophil 4.6 1.6 - 8.3 10e9/L Final    Absolute Lymphocytes 2.2 0.8 - 5.3 10e9/L Final    Absolute Monocytes 0.5 0.0 - 1.3 10e9/L Final    Absolute Eosinophils 0.1 0.0 - 0.7 10e9/L Final    Absolute Basophils 0.0 0.0 - 0.2 10e9/L Final    Abs Immature Granulocytes 0.0 0 - 0.4 10e9/L Final    Absolute Nucleated RBC 0.0  Final         5/21/2018  6:33 PM      Component Results     Component Value Ref Range & Units Status    Sodium 142 133 - 144 mmol/L Final    Potassium 3.9 3.4 - 5.3 mmol/L Final    Chloride 109 94 - 109 mmol/L Final    Carbon Dioxide 26 20 - 32 mmol/L Final    Anion Gap 7 3 - 14 mmol/L Final    Glucose 107 (H) 70 - 99 mg/dL Final    Urea Nitrogen 17 7 - 30 mg/dL Final    Creatinine 0.72 0.52 - 1.04 mg/dL Final    GFR Estimate >90 >60 mL/min/1.7m2 Final    Non  GFR Calc    GFR Estimate If Black >90 >60 mL/min/1.7m2 Final    African American GFR Calc    Calcium 8.7 8.5 - 10.1 mg/dL Final     Bilirubin Total 0.2 0.2 - 1.3 mg/dL Final    Albumin 3.7 3.4 - 5.0 g/dL Final    Protein Total 7.9 6.8 - 8.8 g/dL Final    Alkaline Phosphatase 56 40 - 150 U/L Final    ALT 36 0 - 50 U/L Final    AST 14 0 - 45 U/L Final         5/21/2018  6:14 PM      Component Results     Component Value Ref Range & Units Status    Color Urine Yellow  Final    Appearance Urine Slightly Cloudy  Final    Glucose Urine Negative NEG^Negative mg/dL Final    Bilirubin Urine Negative NEG^Negative Final    Ketones Urine Negative NEG^Negative mg/dL Final    Specific Gravity Urine 1.021 1.003 - 1.035 Final    Blood Urine Negative NEG^Negative Final    pH Urine 5.5 5.0 - 7.0 pH Final    Protein Albumin Urine 10 (A) NEG^Negative mg/dL Final    Urobilinogen mg/dL Normal 0.0 - 2.0 mg/dL Final    Nitrite Urine Negative NEG^Negative Final    Leukocyte Esterase Urine Large (A) NEG^Negative Final    Source Midstream Urine  Final    RBC Urine 2 0 - 2 /HPF Final    WBC Urine 2 0 - 5 /HPF Final    Squamous Epithelial /HPF Urine 10 (H) 0 - 1 /HPF Final    Mucous Urine Present (A) NEG^Negative /LPF Final    Amorphous Crystals Few (A) NEG^Negative /HPF Final         5/21/2018  6:45 PM      Narrative     CT ABDOMEN AND PELVIS WITH CONTRAST   5/21/2018 6:31 PM     HISTORY: Right lower quadrant abdominal pain for the last weak.    COMPARISON: None.    TECHNIQUE: Following the uneventful administration of 124 mL  Isovue-370 intravenous contrast, helical sections were acquired from  the top of the diaphragm through the pubic symphysis. Coronal  reconstructions were generated. Radiation dose for this scan was  reduced using automated exposure control, adjustment of the mA and/or  kV according to the patient's size, or iterative reconstruction  technique.    FINDINGS:     Abdomen: The liver, spleen, pancreas, adrenal glands and kidneys are  unremarkable. Prior cholecystectomy. No enlarged lymph nodes or free  fluid in the upper abdomen.    Scan through the lower  chest is unremarkable.    Pelvis: The small and large bowel are normal in caliber. The appendix  is unremarkable. No bowel wall thickening, pneumatosis or free  intraperitoneal gas. The uterus is present. No enlarged lymph nodes or  free fluid in the pelvis.        Impression     IMPRESSION: No cause of acute pain identified in the abdomen or  pelvis. The appendix is unremarkable.                Clinical Quality Measure: Blood Pressure Screening     Your blood pressure was checked while you were in the emergency department today. The last reading we obtained was  BP: 135/85 . Please read the guidelines below about what these numbers mean and what you should do about them.  If your systolic blood pressure (the top number) is less than 120 and your diastolic blood pressure (the bottom number) is less than 80, then your blood pressure is normal. There is nothing more that you need to do about it.  If your systolic blood pressure (the top number) is 120-139 or your diastolic blood pressure (the bottom number) is 80-89, your blood pressure may be higher than it should be. You should have your blood pressure rechecked within a year by a primary care provider.  If your systolic blood pressure (the top number) is 140 or greater or your diastolic blood pressure (the bottom number) is 90 or greater, you may have high blood pressure. High blood pressure is treatable, but if left untreated over time it can put you at risk for heart attack, stroke, or kidney failure. You should have your blood pressure rechecked by a primary care provider within the next 4 weeks.  If your provider in the emergency department today gave you specific instructions to follow-up with your doctor or provider even sooner than that, you should follow that instruction and not wait for up to 4 weeks for your follow-up visit.        Thank you for choosing [facility]       Thank you for choosing [facility] for your care. Our goal is always to provide you with  "excellent care. Hearing back from our patients is one way we can continue to improve our services. Please take a few minutes to complete the written survey that you may receive in the mail after you visit with us. Thank you!        Osito Information     Osito lets you send messages to your doctor, view your test results, renew your prescriptions, schedule appointments and more. To sign up, go to [email or web address] . Click on \"Log in\" on the left side of the screen, which will take you to the Welcome page. Then click on \"Sign up Now\" on the right side of the page.     You will be asked to enter the access code listed below, as well as some personal information. Please follow the directions to create your username and password.     Your access code is: [email or web address]  Expires: 2018 11:45 AM     Your access code will  in 90 days. If you need help or a new code, please call your [facility] clinic or [phone].        Care EveryWhere ID     This is your Care EveryWhere ID. This could be used by other organizations to access your [facility] medical records  [id number]        Equal Access to Services     LITZY PORTER AH: Hadii ezequiel Lazar, waprosper rutherford, qateddy kaalmacodey menendez, teodoro perea. So [facility] [phone].    ATENCIÓN: Si habla español, tiene a mckeon disposición servicios gratuitos de asistencia lingüística. Benedictoame al [phone].    We comply with applicable federal civil rights laws and [place] laws. We do not discriminate on the basis of race, color, national origin, age, disability, sex, sexual orientation, or gender identity.            After Visit Summary       This is your record. Keep this with you and show to your community pharmacist(s) and doctor(s) at your next visit.                  "

## 2018-05-21 NOTE — ED PROVIDER NOTES
"  History     Chief Complaint:  Abdominal Pain    The history is provided by the patient.      Yu Machado is a 27 year old female with a past surgical history of cholecystectomy in 2012 who presents with abdominal pain. The patient reports onset of right lower quadrant abdominal pain one week ago that has since been unpredictably intermittent in nature, occasionally remaining constant for a full day and at other times being sharp. Patient notes that the pain is exacerbated by certain movements. Patient additionally endorses diarrhea for the last week, and some vaginal discharge today however the vaginal discharge is not abnormal for her. Patient denies nausea, vomiting, fever, dysuria or other urinary symptoms, history of kidney stones, or other complaint. Denies concern for STDs.    Allergies:  No known drug allergies     Medications:    Lorazepam    Past Medical History:    Chronic low back pain  Obesity    Past Surgical History:    Cholecystectomy (2012)  Lumbar back surgery    Family History:    Hypertension    Social History:  Presents with  and daughter   Tobacco use: Current every day  Alcohol use: Yes  PCP: Physician No Ref-Primary    Marital Status:  Single     Review of Systems   Constitutional: Negative for fever.   Gastrointestinal: Positive for abdominal pain. Negative for nausea and vomiting.   Genitourinary: Positive for vaginal discharge. Negative for dysuria.   All other systems reviewed and are negative.      Physical Exam     Patient Vitals for the past 24 hrs:   BP Temp Temp src Heart Rate Resp SpO2 Height Weight   05/21/18 1900 - - - - - 96 % - -   05/21/18 1856 - - - - - 98 % - -   05/21/18 1815 - - - - - 97 % - -   05/21/18 1719 135/85 96  F (35.6  C) Temporal 87 16 95 % 1.6 m (5' 3\") 112.5 kg (248 lb)        Physical Exam  General: Resting comfortably on the gurney  Eyes:  The pupils are equal and round    Conjunctivae and sclerae are normal  ENT:    Moist mucous " membranes  Neck:  Normal range of motion  CV:  Regular rate and rhythm    Skin warm and well perfused   Resp:  Lungs are clear    Non-labored    No rales    No wheezing   GI:  Abdomen is soft, there is no rigidity    No distension    No rebound tenderness     No guarding    Mild mid right sided abdominal pain  MS:  Normal muscular tone  Skin:  No rash or acute skin lesions noted  Neuro:   Awake, alert.      Speech is normal and fluent.    Face is symmetric.     Moves all extremities  Psych:  Normal affect.  Appropriate interactions.     Emergency Department Course     Imaging:   Radiographic findings were communicated with the patient who voiced understanding of the findings.    CT Abd/pelvis with contrast:  IMPRESSION: No cause of acute pain identified in the abdomen or pelvis. The appendix is unremarkable.    Imaging independently reviewed and agree with radiologist interpretation.      Laboratory:  CBC: AWNL (WBC 7.5, HGB 13.3, )  CMP:  (H) o/w WNL (Creatinine 0.72)    UA with micro: Protein albumin 10, LE large, SE 10 (H), mucous present, amorphous few, o/w negative  HCG Qualitative Urine: Negative     Interventions:  1803: Morphine 4 mg IV  1803: NS 1L IV Bolus   1804: Zofran 4 mg IV  2021: Toradol 15 mg IV   The patient's symptoms were improved with parenteral narcotics.    Emergency Department Course:  Past medical records, nursing notes, and vitals reviewed.  1800: I performed an exam of the patient and obtained history, as documented above.    Above interventions provided.  Blood drawn. This was sent to the lab for further testing, results above.  The patient provided a urine sample here in the emergency department. This was sent for laboratory testing, findings above.     2031: I rechecked the patient. Findings and plan explained to the Patient. Patient discharged home with instructions regarding supportive care, medications, and reasons to return. The importance of close follow-up was  reviewed.      Impression & Plan      Medical Decision Making:  Yu Machado is a 27 year old female who presents to the emergency department with abdominal pain. Seen in the emergency department last week and had a pelvic ultrasound which was negative. Has continued to have pain since then and was sent in for CT abdomen. Has mild tenderness to palpation on right side of abdomen on mid-abdominal region. Really not lower abdominal but more mid. Has had prior cholecystectomy so unlikely gallbladder pathology and don't suspect retained stone. Laboratory values unremarkable. CT abdomen is also unremarkable. She is very comfortable in the room and doubt ovarian torsion given no acute onset of the pain. Doubt PID given that she denies any concern for sexually transmitted diseases and denies any abnormal discharge. She is having minimal discharge that is associated with the start of her period and she suspects her period will start soon and the discharge is not abnormal for her. Does not think that she needs to be tested for STDs. At the end of the visit she also mentioned that she is having some breast pain, and when I asked her more questions she reports that she has had this pain for a long time. There is pain very superficially on her right breast and I feel a small lump on this area of discomfort. I discussed that she needs to follow up with her primary care provider about this and may warrant ultrasound or mammogram. No overlying skin abnormalities such as erythema to suggest abscess or cellulitis. Doubt PE given that pain is very superficial on breast. No flank pain to suggest PE and no associated shortness of breath. Recommend follow up with primary care provider and discussed symptomatic treatment at home.    Diagnosis:    ICD-10-CM   1. Abdominal pain, right lower quadrant R10.31       Disposition:  Discharged to home with plan as outlined.       Kiet RODGERS, am serving as a scribe at 5:57 PM on  5/21/2018 to document services personally performed by Mayra Hanna MD based on my observations and the provider's statements to me.    5/21/2018    EMERGENCY DEPARTMENT       Mayra Hanna MD  05/21/18 1556

## 2018-05-21 NOTE — LETTER
May 21, 2018      To Whom It May Concern:      Yu Machado was seen in our Emergency Department today, 05/21/18.  I expect her condition to improve over the next 1-2 days.  She may return to work/school when improved.    Sincerely,        Natalie Dudley RN

## 2018-05-21 NOTE — MR AVS SNAPSHOT
After Visit Summary   5/21/2018    Yu Machado    MRN: [id number]           Patient Information     Date Of Birth          1990        Visit Information        Provider Department      5/21/2018 4:40 PM Ayse Cheng MD [facility]        Today's Diagnoses     PTSD (post-traumatic stress disorder)    -  1    Anxiety attack        Primary insomnia           Follow-ups after your visit        Additional Services     MENTAL HEALTH REFERRAL  - Adult; Psychiatry and Medication Management; Psychiatry; [facility]: Psychiatry Clinic [phone]; We will contact you to schedule the appointment or please call with any questions       All scheduling is subject to the client's specific insurance plan & benefits, provider/location availability, and provider clinical specialities.  Please arrive 15 minutes early for your first appointment and bring your completed paperwork.    Please be aware that coverage of these services is subject to the terms and limitations of your health insurance plan.  Call member services at your health plan with any benefit or coverage questions.                            Who to contact     If you have questions or need follow up information about today's clinic visit or your schedule please contact [facility] directly at [phone].  Normal or non-critical lab and imaging results will be communicated to you by MyChart, letter or phone within 4 business days after the clinic has received the results. If you do not hear from us within 7 days, please contact the clinic through [email or web address] or phone. If you have a critical or abnormal lab result, we will notify you by phone as soon as possible.  Submit refill requests through [facility] or call your pharmacy and they will forward the refill request to us. Please allow 3 business days for your refill to be completed.          Additional Information About Your Visit        MyChart Information     [facility] lets you  "send messages to your doctor, view your test results, renew your prescriptions, schedule appointments and more. To sign up, go to [email or web address] . Click on \"Log in\" on the left side of the screen, which will take you to the Welcome page. Then click on \"Sign up Now\" on the right side of the page.     You will be asked to enter the access code listed below, as well as some personal information. Please follow the directions to create your username and password.     Your access code is: [email or web address]  Expires: 2018 11:45 AM     Your access code will  in 90 days. If you need help or a new code, please call your [place] clinic or [phone].        Care EveryWhere ID     This is your Care EveryWhere ID. This could be used by other organizations to access your [place] medical records  [id number]        Your Vitals Were     Pulse Temperature Respirations Height Last Period Pulse Oximetry    75 97.1  F (36.2  C) (Tympanic) 18 5' 3\" (1.6 m) 2018 98%    BMI (Body Mass Index)                   43.05 kg/m2            Blood Pressure from Last 3 Encounters:   18 122/88   05/15/18 112/76   18 122/70    Weight from Last 3 Encounters:   18 243 lb (110.2 kg)   18 238 lb (108 kg)   18 246 lb (111.6 kg)              We Performed the Following     MENTAL HEALTH REFERRAL  - Adult; Psychiatry and Medication Management; Psychiatry; [facility]: Psychiatry Clinic [phone]; We will contact you to schedule the appointment or please call with any questions          Today's Medication Changes          These changes are accurate as of 18  5:00 PM.  If you have any questions, ask your nurse or doctor.               Start taking these medicines.        Dose/Directions    LORazepam 0.5 MG tablet   Commonly known as:  ATIVAN   Used for:  Anxiety attack, PTSD (post-traumatic stress disorder), Primary insomnia   Started by:  Ayse Cheng MD        Dose:  0.5 mg   Take 1 tablet (0.5 " mg) by mouth nightly as needed for anxiety   Quantity:  20 tablet   Refills:  0            Where to get your medicines      Some of these will need a paper prescription and others can be bought over the counter.  Ask your nurse if you have questions.     Bring a paper prescription for each of these medications     LORazepam 0.5 MG tablet                Primary Care Provider Fax #    Physician No Ref-Primary [phone]       No address on file        Equal Access to Services     [facility] Center[facility] : Hadii ezequiel ku hadasho Soomaali, waaxda luqadaha, qaybta kaalmada adeegyada, waxloren lobato helenn ademartínez lalo labrandon . So [facility] [phone].    ATENCIÓN: Si habla español, tiene a mckeon disposición servicios gratuitos de asistencia lingüística. Llame al [phone].    We comply with applicable federal civil rights laws and [place] laws. We do not discriminate on the basis of race, color, national origin, age, disability, sex, sexual orientation, or gender identity.            Thank you!     Thank you for choosing [facility]  for your care. Our goal is always to provide you with excellent care. Hearing back from our patients is one way we can continue to improve our services. Please take a few minutes to complete the written survey that you may receive in the mail after your visit with us. Thank you!             Your Updated Medication List - Protect others around you: Learn how to safely use, store and throw away your medicines at www.disposemymeds.org.          This list is accurate as of 5/21/18  5:00 PM.  Always use your most recent med list.                   Brand Name Dispense Instructions for use Diagnosis    LORazepam 0.5 MG tablet    ATIVAN    20 tablet    Take 1 tablet (0.5 mg) by mouth nightly as needed for anxiety    Anxiety attack, PTSD (post-traumatic stress disorder), Primary insomnia
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
## 2018-05-30 ENCOUNTER — OFFICE VISIT (OUTPATIENT)
Dept: PEDIATRICS | Facility: CLINIC | Age: 28
End: 2018-05-30
Payer: MEDICAID

## 2018-05-30 VITALS
OXYGEN SATURATION: 97 % | HEIGHT: 63 IN | HEART RATE: 64 BPM | BODY MASS INDEX: 43.09 KG/M2 | TEMPERATURE: 97.6 F | DIASTOLIC BLOOD PRESSURE: 76 MMHG | SYSTOLIC BLOOD PRESSURE: 112 MMHG | WEIGHT: 243.2 LBS

## 2018-05-30 DIAGNOSIS — M54.42 ACUTE LEFT-SIDED LOW BACK PAIN WITH LEFT-SIDED SCIATICA: Primary | ICD-10-CM

## 2018-05-30 PROBLEM — E66.01 MORBID OBESITY (H): Status: ACTIVE | Noted: 2018-05-30

## 2018-05-30 PROCEDURE — 99214 OFFICE O/P EST MOD 30 MIN: CPT | Performed by: PHYSICIAN ASSISTANT

## 2018-05-30 NOTE — PROGRESS NOTES
"  SUBJECTIVE:   Yu Machado is a 27 year old female who presents to clinic today for the following health issues:    Back Pain     Duration: last couple months and getting worse        Specific cause: none    Description:   Location of pain: low back bilateral, gluteus bilateral L>R  Character of pain: sharp, dull ache, stabbing, gnawing, burning and constant  Pain radiation: radiates into the left buttocks, left leg and left foot  New numbness or weakness in legs, not attributed to pain:  Numbness and weakness in the left leg    Intensity: Currently 7/10, At its worst 10/10    History:   Pain interferes with job: YES--patient works at [employer]. Completes lifting.  History of back problems: previous spinal surgery - 2014 --given \"paralysis\" of left leg, likely impinged nerve.   Any previous MRI or X-rays: Yes--in [place]  Date 2014  Sees a specialist for back pain:  No  Therapies tried without relief: acetaminophen (Tylenol), heat, NSAIDs and rest    Alleviating factors:   Improved by: none      Precipitating factors:  Worsened by: Lifting, Bending, Standing, Sitting, Lying Flat, Walking and Coughing    Functional and Psychosocial Screen (Jorge Luis STarT Back):      Not performed today    She has a 4 year old daughter. Boyfriend.  No primary care doctor.      Accompanying Signs & Symptoms:  Risk of Fracture:  None  Risk of Cauda Equina:  None  Risk of Infection:  None  Risk of Cancer:  None  Risk of Ankylosing Spondylitis:  Onset at age <35, male, AND morning back stiffness. no     ROS:  ROS otherwise negative    OBJECTIVE:                                                    /76 (BP Location: Right arm, Cuff Size: Adult Large)  Pulse 64  Temp 97.6  F (36.4  C) (Oral)  Ht 5' 3\" (1.6 m)  Wt 243 lb 3.2 oz (110.3 kg)  SpO2 97%  BMI 43.08 kg/m2  Body mass index is 43.08 kg/(m^2).   GENERAL:alert and does not appear to be in pain or discomfort  Lumber/Thoracic Spine Exam: Tender:  right para lumbar muscles  Range of " Motion:  lumbar flexion  unable, lumbar extension  decreased, left lateral lumbar bending  decreased, right lateral lumbar bending  unable  Strength: diminished strength of the left LE    Diagnostic Test Results:  No results found for this or any previous visit (from the past 24 hour(s)).     ASSESSMENT/PLAN:                                                    (M54.42) Acute left-sided low back pain with left-sided sciatica  (primary encounter diagnosis)  Comment: patient has long history of LBP s/p surgery. She does not appear to be in discomfort today. She is requesting pain medication--discussed this is not the appropriate plan for her. Scheduled patient with ortho for further evaluation. Work excuse given to avoid lifting at work and daughter. Patient in agreement with plan.  Plan: ORTHO  REFERRAL          See Patient Instructions    Seven Landeros PA-C  [facility]

## 2018-05-30 NOTE — MR AVS SNAPSHOT
After Visit Summary   5/30/2018    Yu Machado    MRN: [id number]           Patient Information     Date Of Birth          1990        Visit Information        Provider Department      5/30/2018 1:30 PM Seven Landeros PA-C [facility]        Today's Diagnoses     Acute left-sided low back pain with left-sided sciatica    -  1      Care Instructions    Follow up with ortho  Release medical records to [facility]  Note for remainder of week off          Follow-ups after your visit        Additional Services     ORTHO  REFERRAL       [facility] is referring you to the Orthopedic  Services at [facility].       The  Representative will assist you in the coordination of your Orthopedic and Musculoskeletal Care as prescribed by your physician.    The  Representative will call you within 1 business day to help schedule your appointment, or you may contact the  Representative at:    All areas ~ [phone]     Type of Referral : Non Surgical       Timeframe requested: 3 - 5 days    Coverage of these services is subject to the terms and limitations of your health insurance plan.  Please call member services at your health plan with any benefit or coverage questions.      If X-rays, CT or MRI's have been performed, please contact the facility where they were done to arrange for , prior to your scheduled appointment.  Please bring this referral request to your appointment and present it to your specialist.                  Your next 10 appointments already scheduled     Jun 04, 2018  2:20 PM CDT   New Visit with Russ Shell MD   [facility] ([facility])    [address] [place] [id number] [phone]              Who to contact     If you have questions or need follow up information about today's clinic visit or your  "schedule please contact [facility] directly at [phone].  Normal or non-critical lab and imaging results will be communicated to you by MyChart, letter or phone within 4 business days after the clinic has received the results. If you do not hear from us within 7 days, please contact the clinic through MyChart or phone. If you have a critical or abnormal lab result, we will notify you by phone as soon as possible.  Submit refill requests through [employer] NightCove or call your pharmacy and they will forward the refill request to us. Please allow 3 business days for your refill to be completed.          Additional Information About Your Visit        [employer] Information     [employer] lets you send messages to your doctor, view your test results, renew your prescriptions, schedule appointments and more. To sign up, go to [email or web address] . Click on \"Log in\" on the left side of the screen, which will take you to the Welcome page. Then click on \"Sign up Now\" on the right side of the page.     You will be asked to enter the access code listed below, as well as some personal information. Please follow the directions to create your username and password.     Your access code is: [email or web address]  Expires: 2018 11:45 AM     Your access code will  in 90 days. If you need help or a new code, please call your [facility] clinic or [phone].        Care EveryWhere ID     This is your Care EveryWhere ID. This could be used by other organizations to access your [facility] medical records  [id number]        Your Vitals Were     Pulse Temperature Height Pulse Oximetry BMI (Body Mass Index)       64 97.6  F (36.4  C) (Oral) 5' 3\" (1.6 m) 97% 43.08 kg/m2        Blood Pressure from Last 3 Encounters:   18 112/76   18 135/85   18 122/88    Weight from Last 3 Encounters:   18 243 lb 3.2 oz (110.3 kg)   18 248 lb (112.5 kg)   18 243 lb (110.2 kg)              We Performed the Following  "    ORTHO  REFERRAL        Primary Care Provider Fax #    Physician No Ref-Primary [phone]       No address on file        Equal Access to Services     [employer] [place]TER : Hadii aad ku hadjadiel Lazar, gloria rutherford, sammy menendez, teodoro pedraza So [facility] [phone].    ATENCIÓN: Si habla español, tiene a mckeon disposición servicios gratuitos de asistencia lingüística. Llame al [phone].    We comply with applicable federal civil rights laws and [place] laws. We do not discriminate on the basis of race, color, national origin, age, disability, sex, sexual orientation, or gender identity.            Thank you!     Thank you for choosing [facility]  for your care. Our goal is always to provide you with excellent care. Hearing back from our patients is one way we can continue to improve our services. Please take a few minutes to complete the written survey that you may receive in the mail after your visit with us. Thank you!             Your Updated Medication List - Protect others around you: Learn how to safely use, store and throw away your medicines at www.disposemymeds.org.          This list is accurate as of 5/30/18  2:43 PM.  Always use your most recent med list.                   Brand Name Dispense Instructions for use Diagnosis    LORazepam 0.5 MG tablet    ATIVAN    20 tablet    Take 1 tablet (0.5 mg) by mouth nightly as needed for anxiety    Anxiety attack, PTSD (post-traumatic stress disorder), Primary insomnia

## 2018-05-30 NOTE — LETTER
May 30, 2018      Yu LEE Machado  [address] [place]  [place] [address]        To Whom It May Concern:    Yu LEE Machado  was seen on 5/30/18.  Please excuse her through 6/1/18 due to injury.        Sincerely,        Seven Landeros PA-C

## 2018-06-04 ENCOUNTER — OFFICE VISIT (OUTPATIENT)
Dept: NEUROSURGERY | Facility: CLINIC | Age: 28
End: 2018-06-04
Attending: PHYSICIAN ASSISTANT
Payer: MEDICAID

## 2018-06-04 VITALS
TEMPERATURE: 98.2 F | OXYGEN SATURATION: 96 % | DIASTOLIC BLOOD PRESSURE: 71 MMHG | HEART RATE: 80 BPM | WEIGHT: 244 LBS | BODY MASS INDEX: 43.22 KG/M2 | SYSTOLIC BLOOD PRESSURE: 123 MMHG

## 2018-06-04 DIAGNOSIS — M54.16 LUMBAR RADICULOPATHY: Primary | ICD-10-CM

## 2018-06-04 PROCEDURE — 99243 OFF/OP CNSLTJ NEW/EST LOW 30: CPT | Performed by: PHYSICIAN ASSISTANT

## 2018-06-04 PROCEDURE — G0463 HOSPITAL OUTPT CLINIC VISIT: HCPCS

## 2018-06-04 RX ORDER — DIAZEPAM 10 MG
10 TABLET ORAL EVERY 6 HOURS PRN
Qty: 2 TABLET | Refills: 0 | Status: SHIPPED | OUTPATIENT
Start: 2018-06-04 | End: 2018-07-17

## 2018-06-04 NOTE — MR AVS SNAPSHOT
"              After Visit Summary   6/4/2018    Yu Machado    MRN: [id number]           Patient Information     Date Of Birth          1990        Visit Information        Provider Department      6/4/2018 2:30 PM Cheri Martinez PA-C [facility]        Today's Diagnoses     Lumbar radiculopathy    -  1      Care Instructions    Lumbar MRI ordered - I will contact you with results          Follow-ups after your visit        Your next 10 appointments already scheduled     Ari 15, 2018  1:00 PM CDT   Adult General Eval with DENISSE Ho CNP   Psychiatry Clinic ([facility])    [address]  [address] [address] [phone]              Future tests that were ordered for you today     Open Future Orders        Priority Expected Expires Ordered    MR Lumbar Spine w/o Contrast Routine  6/4/2019 6/4/2018            Who to contact     If you have questions or need follow up information about today's clinic visit or your schedule please contact [facility] directly at [phone].  Normal or non-critical lab and imaging results will be communicated to you by InCortahart, letter or phone within 4 business days after the clinic has received the results. If you do not hear from us within 7 days, please contact the clinic through OptTownt or phone. If you have a critical or abnormal lab result, we will notify you by phone as soon as possible.  Submit refill requests through MakeLeaps or call your pharmacy and they will forward the refill request to us. Please allow 3 business days for your refill to be completed.          Additional Information About Your Visit        InCortahart Information     MakeLeaps lets you send messages to your doctor, view your test results, renew your prescriptions, schedule appointments and more. To sign up, go to [email or web address] . Click on \"Log in\" on the left " "side of the screen, which will take you to the Welcome page. Then click on \"Sign up Now\" on the right side of the page.     You will be asked to enter the access code listed below, as well as some personal information. Please follow the directions to create your username and password.     Your access code is: [email or web address]  Expires: 2018 11:45 AM     Your access code will  in 90 days. If you need help or a new code, please call your [facility] or [phone].        Care EveryWhere ID     This is your Care EveryWhere ID. This could be used by other organizations to access your [facility] medical records  [id number]        Your Vitals Were     Pulse Temperature Pulse Oximetry Breastfeeding? BMI (Body Mass Index)       80 98.2  F (36.8  C) (Oral) 96% No 43.22 kg/m2        Blood Pressure from Last 3 Encounters:   18 123/71   18 112/76   18 135/85    Weight from Last 3 Encounters:   18 244 lb (110.7 kg)   18 243 lb 3.2 oz (110.3 kg)   18 248 lb (112.5 kg)                 Today's Medication Changes          These changes are accurate as of 18  2:39 PM.  If you have any questions, ask your nurse or doctor.               Start taking these medicines.        Dose/Directions    diazepam 10 MG tablet   Commonly known as:  VALIUM   Used for:  Lumbar radiculopathy   Started by:  Cheri Martinez PA-C        Dose:  10 mg   Take 1 tablet (10 mg) by mouth every 6 hours as needed for anxiety or sleep Take 30-60 minutes before procedure.  Do not operate a vehicle after taking this medication.   Quantity:  2 tablet   Refills:  0            Where to get your medicines      Some of these will need a paper prescription and others can be bought over the counter.  Ask your nurse if you have questions.     Bring a paper prescription for each of these medications     diazepam 10 MG tablet                Primary Care Provider Fax #    Physician No Ref-Primary [phone]       No " address on file        Equal Access to Services     [facility] [place] : Hadii aad tin dannielle Lazar, waprosper sarahiabiodun, sammy umaña shongabbycodey, teodoro cheryle heinjosebrad perea. So [facility] [phone].    ATENCIÓN: Si habla español, tiene a mckeon disposición servicios gratuitos de asistencia lingüística. Llame al [phone].    We comply with applicable federal civil rights laws and [place] laws. We do not discriminate on the basis of race, color, national origin, age, disability, sex, sexual orientation, or gender identity.            Thank you!     Thank you for choosing [facility]  for your care. Our goal is always to provide you with excellent care. Hearing back from our patients is one way we can continue to improve our services. Please take a few minutes to complete the written survey that you may receive in the mail after your visit with us. Thank you!             Your Updated Medication List - Protect others around you: Learn how to safely use, store and throw away your medicines at www.disposemymeds.org.          This list is accurate as of 6/4/18  2:39 PM.  Always use your most recent med list.                   Brand Name Dispense Instructions for use Diagnosis    diazepam 10 MG tablet    VALIUM    2 tablet    Take 1 tablet (10 mg) by mouth every 6 hours as needed for anxiety or sleep Take 30-60 minutes before procedure.  Do not operate a vehicle after taking this medication.    Lumbar radiculopathy       LORazepam 0.5 MG tablet    ATIVAN    20 tablet    Take 1 tablet (0.5 mg) by mouth nightly as needed for anxiety    Anxiety attack, PTSD (post-traumatic stress disorder), Primary insomnia

## 2018-06-04 NOTE — LETTER
6/4/2018         RE: Yu Machado  [address]  [place] [address]        Dear Colleague,    Thank you for referring your patient, Yu Machado, to the [facility]. Please see a copy of my visit note below.    Dr. Russ Shell  [facility]  Neurosurgery Clinic Visit      CC: Low back and left leg pain    Primary care Provider: No Ref-Primary, Physician      Reason For Visit:   I was asked by Seven Landeros PA-C to consult on the patient for acute left sided low back pain with left sided sciatica.      HPI: Yu Machado is a 27 year old female who presents for evaluation of low back and leg pain x 2-3 months. Pain is located in left low back and radiates down left lateral aspect of the leg to the top and lateral aspect of the left foot .She is having associated numbness in this distribution as well as the 2nd-5th toes. Describes the pain as achy, sharp, burning. Pain is worsened with standing, walking, and any movement. Pain is alleviated with lying down. She did have lumbar surgery in [place], [place] in 2014, but she is unsure what she had done. States she had foot drop before and after this surgery, which she feels is worsening recently. No recent PT, injections, or imaging dedicated to the lumbar spine. Denies bladder/bowel incontinence.    Current pain: 7/10 At worst: 10/10    Past Medical History:   Diagnosis Date     Chronic low back pain      Obesity        Past Medical History reviewed with patient during visit.    Past Surgical History:   Procedure Laterality Date     CHOLECYSTECTOMY       Lumbar back surgery       Past Surgical History reviewed with patient during visit.    Current Outpatient Prescriptions   Medication     LORazepam (ATIVAN) 0.5 MG tablet     No current facility-administered medications for this visit.        No Known Allergies    Social History     Social History     Marital status: Single     Spouse name: N/A      Number of children: N/A     Years of education: N/A     Social History Main Topics     Smoking status: Current Every Day Smoker     Smokeless tobacco: Never Used     Alcohol use Yes     Drug use: No     Sexual activity: Yes     Partners: Male     Birth control/ protection: None     Other Topics Concern     None     Social History Narrative       Family History   Problem Relation Age of Onset     Hypertension Mother           ROS: 10 point ROS neg other than the symptoms noted above in the HPI.    Vital Signs: /71 (BP Location: Left arm, Patient Position: Chair, Cuff Size: Adult Large)  Pulse 80  Temp 98.2  F (36.8  C) (Oral)  Wt 244 lb (110.7 kg)  SpO2 96%  Breastfeeding? No  BMI 43.22 kg/m2    Examination:  Constitutional:  Alert, obese, NAD.  HEENT: Normocephalic, atraumatic.   Pulmonary:  Without shortness of breath, normal effort.   Lymph: no lymphadenopathy to low back or LE.   Integumentary: Skin is free of rashes or lesions.   Cardiovascular:  No pitting edema of BLE.      Neurological:  Awake  Alert  Oriented x 3  Speech clear  Cranial nerves II - XII grossly intact  PERRL  EOMI  Face symmetric  Tongue midline  Motor exam   Hip Flexor:                Right: 5/5  Left:  5/5  Hip Adductor:             Right:  5/5  Left:  5/5  Hip Abductor:             Right:  5/5  Left:  5/5  Gastroc Soleus:        Right:  5/5  Left:  5/5  Tib/Ant:                      Right:  5/5  Left:  5/5  EHL:                          Right:  5/5  Left:  5/5       Sensation normal to bilateral upper and lower extremities.    Reflexes are 2+ in the patellar and Achilles. There is no clonus. Downgoing Babinski.  Musculoskeletal:  Gait: Able to stand from a seated position. Normal non-antalgic, non-myelopathic gait. No overt foot drop.  Able to heel/toe walk without loss of balance  Lumbar examination reveals tenderness of the spine and bilateral paraspinous muscles.  Hip height is symmetrical. Negative SI joint, sciatic notch or  greater trochanteric tenderness to palpation bilaterally.  Straight leg raise is positive on the left.    Imaging:   None    Assessment/Plan:   Yu Machado is a 27 year old female who presents for evaluation of low back and leg pain x 2-3 months. Pain is located in left low back and radiates down left lateral aspect of the leg to the top and lateral aspect of the left foot .She is having associated numbness in this distribution as well as the 2nd-5th toes. Describes the pain as achy, sharp, burning. She does have radicular pain in an L5 distribution. Given her previous lumbar surgery (which we will obtain records from to determine what exactly she had done) and radicular pain pattern, I have ordered a lumbar MRI for further evaluation and will call her with the results. I have also given her a letter to be excused from work today. She did also request pain medication, which I advised she take tylenol and/or NSAIDs. Patient voiced understanding and agreement.          Cheri Martinez PA-C  [facility]  [address] [address]    Tel [phone]  Pager [phone]      Again, thank you for allowing me to participate in the care of your patient.        Sincerely,        Cheri Martinez PA-C

## 2018-06-04 NOTE — NURSING NOTE
"Yu Machado is a 27 year old female who presents for:  Chief Complaint   Patient presents with     Consult     NO IMG/ NO NP/PA  AVAILABLE //Acute left-sided low back pain with left-sided sciatica/IVETTE/Seven Landeros PA-C/MA/[facility]        Vitals:    Vitals:    06/04/18 1419   BP: 123/71   BP Location: Left arm   Patient Position: Chair   Cuff Size: Adult Large   Pulse: 80   Temp: 98.2  F (36.8  C)   TempSrc: Oral   SpO2: 96%   Weight: 244 lb (110.7 kg)       BMI:  Estimated body mass index is 43.22 kg/(m^2) as calculated from the following:    Height as of 5/30/18: 5' 3\" (1.6 m).    Weight as of this encounter: 244 lb (110.7 kg).    Pain Score:  7      Rina Martins    "

## 2018-06-04 NOTE — PROGRESS NOTES
Dr. Russ Shell  [facility]  Neurosurgery Clinic Visit      CC: Low back and left leg pain    Primary care Provider: No Ref-Primary, Physician      Reason For Visit:   I was asked by Seven Landeros PA-C to consult on the patient for acute left sided low back pain with left sided sciatica.      HPI: Yu Machado is a 27 year old female who presents for evaluation of low back and leg pain x 2-3 months. Pain is located in left low back and radiates down left lateral aspect of the leg to the top and lateral aspect of the left foot .She is having associated numbness in this distribution as well as the 2nd-5th toes. Describes the pain as achy, sharp, burning. Pain is worsened with standing, walking, and any movement. Pain is alleviated with lying down. She did have lumbar surgery in [place], [place] in 2014, but she is unsure what she had done. States she had foot drop before and after this surgery, which she feels is worsening recently. No recent PT, injections, or imaging dedicated to the lumbar spine. Denies bladder/bowel incontinence.    Current pain: 7/10 At worst: 10/10    Past Medical History:   Diagnosis Date     Chronic low back pain      Obesity        Past Medical History reviewed with patient during visit.    Past Surgical History:   Procedure Laterality Date     CHOLECYSTECTOMY       Lumbar back surgery       Past Surgical History reviewed with patient during visit.    Current Outpatient Prescriptions   Medication     LORazepam (ATIVAN) 0.5 MG tablet     No current facility-administered medications for this visit.        No Known Allergies    Social History     Social History     Marital status: Single     Spouse name: N/A     Number of children: N/A     Years of education: N/A     Social History Main Topics     Smoking status: Current Every Day Smoker     Smokeless tobacco: Never Used     Alcohol use Yes     Drug use: No     Sexual activity: Yes     Partners: Male     Birth control/  protection: None     Other Topics Concern     None     Social History Narrative       Family History   Problem Relation Age of Onset     Hypertension Mother           ROS: 10 point ROS neg other than the symptoms noted above in the HPI.    Vital Signs: /71 (BP Location: Left arm, Patient Position: Chair, Cuff Size: Adult Large)  Pulse 80  Temp 98.2  F (36.8  C) (Oral)  Wt 244 lb (110.7 kg)  SpO2 96%  Breastfeeding? No  BMI 43.22 kg/m2    Examination:  Constitutional:  Alert, obese, NAD.  HEENT: Normocephalic, atraumatic.   Pulmonary:  Without shortness of breath, normal effort.   Lymph: no lymphadenopathy to low back or LE.   Integumentary: Skin is free of rashes or lesions.   Cardiovascular:  No pitting edema of BLE.      Neurological:  Awake  Alert  Oriented x 3  Speech clear  Cranial nerves II - XII grossly intact  PERRL  EOMI  Face symmetric  Tongue midline  Motor exam   Hip Flexor:                Right: 5/5  Left:  5/5  Hip Adductor:             Right:  5/5  Left:  5/5  Hip Abductor:             Right:  5/5  Left:  5/5  Gastroc Soleus:        Right:  5/5  Left:  5/5  Tib/Ant:                      Right:  5/5  Left:  5/5  EHL:                          Right:  5/5  Left:  5/5       Sensation normal to bilateral upper and lower extremities.    Reflexes are 2+ in the patellar and Achilles. There is no clonus. Downgoing Babinski.  Musculoskeletal:  Gait: Able to stand from a seated position. Normal non-antalgic, non-myelopathic gait. No overt foot drop.  Able to heel/toe walk without loss of balance  Lumbar examination reveals tenderness of the spine and bilateral paraspinous muscles.  Hip height is symmetrical. Negative SI joint, sciatic notch or greater trochanteric tenderness to palpation bilaterally.  Straight leg raise is positive on the left.    Imaging:   None    Assessment/Plan:   Yu Machdao is a 27 year old female who presents for evaluation of low back and leg pain x 2-3 months. Pain is  located in left low back and radiates down left lateral aspect of the leg to the top and lateral aspect of the left foot .She is having associated numbness in this distribution as well as the 2nd-5th toes. Describes the pain as achy, sharp, burning. She does have radicular pain in an L5 distribution. Given her previous lumbar surgery (which we will obtain records from to determine what exactly she had done) and radicular pain pattern, I have ordered a lumbar MRI for further evaluation and will call her with the results. I have also given her a letter to be excused from work today. She did also request pain medication, which I advised she take tylenol and/or NSAIDs. Patient voiced understanding and agreement.    ADDENDUM: Op report from [facility] in [place] received. Patient had bilateral L4-5 microdiscectomy.          Cheri Martinez PA-C  [facility]  [address] [address]    Tel [phone]  Pager [phone]

## 2018-06-04 NOTE — LETTER
[facility]   [facility]  [address] [place] [address]  [id number]    June 4, 2018      To Whom it May Concern,          Yu ROSA Machado was seen in clinic on 6/4/2018.  Please excuse her from work today as she was at her appointment.            Sincerely,        Cheri Martinez PA-C  [facility]  [address] [address]    Tel [phone]

## 2018-06-05 ENCOUNTER — TELEPHONE (OUTPATIENT)
Dept: NEUROSURGERY | Facility: CLINIC | Age: 28
End: 2018-06-05

## 2018-06-05 ENCOUNTER — HOSPITAL ENCOUNTER (OUTPATIENT)
Dept: MRI IMAGING | Facility: CLINIC | Age: 28
Discharge: HOME | End: 2018-06-05
Attending: PHYSICIAN ASSISTANT | Admitting: PHYSICIAN ASSISTANT
Payer: MEDICAID

## 2018-06-05 DIAGNOSIS — M54.16 LUMBAR RADICULOPATHY: ICD-10-CM

## 2018-06-05 DIAGNOSIS — M54.16 LUMBAR RADICULOPATHY: Primary | ICD-10-CM

## 2018-06-05 PROCEDURE — 72148 MRI LUMBAR SPINE W/O DYE: CPT

## 2018-06-05 NOTE — TELEPHONE ENCOUNTER
Cheri GIRALDO reviewed recent lumbar MRI. Shows left L5-S1 disc herniation. Cheri recommends to start PT course, and can f/u with Dr. Shell in clinic afterwards.     Discussed with patient and she voiced agreement. Placed order. Advised patient to call me back with questions/concerns.

## 2018-06-06 ENCOUNTER — THERAPY VISIT (OUTPATIENT)
Dept: PHYSICAL THERAPY | Facility: CLINIC | Age: 28
End: 2018-06-06
Attending: PHYSICIAN ASSISTANT
Payer: MEDICAID

## 2018-06-06 DIAGNOSIS — M51.26 LUMBAR DISC HERNIATION: Primary | ICD-10-CM

## 2018-06-06 PROCEDURE — 97110 THERAPEUTIC EXERCISES: CPT | Mod: GP | Performed by: PHYSICAL THERAPIST

## 2018-06-06 PROCEDURE — 97162 PT EVAL MOD COMPLEX 30 MIN: CPT | Mod: GP | Performed by: PHYSICAL THERAPIST

## 2018-06-06 NOTE — PROGRESS NOTES
Subjective:  Patient is a 27 year old female presenting with rehab back hpi. The history is provided by the patient.   Yu Machado is a 27 year old female with a lumbar condition.  Condition occurred with:  Insidious onset.  Condition occurred: for unknown reasons.  This is a chronic condition  History of lumbar pain since 2012 secondary to being pregnant. Pt has surgery in 2014 ( pt can't recall what type of surgery she had. ) pt noted onset of lumbar pain radiating into the left lower extremity 2-3 months ago of unknown etiology. Recent MRI shows L5-S1 left disc herniation. Pt referred by MD for physical therapy on 6-5-18.    Patient reports pain:  Lumbar spine left and lumbar spine right (L>R).  Radiates to:  Foot left, gluteals left, knee left, lower leg left and thigh left.  Pain is described as aching, sharp and burning and is constant and reported as 10/10.  Associated symptoms:  Tingling, numbness, loss of motion/stiffness and loss of strength. Pain is worse in the A.M..  Symptoms are exacerbated by bending, twisting, carrying, lifting, sitting and standing and relieved by rest.  Since onset symptoms are gradually worsening.  Special tests:  MRI (see report).  Previous treatment includes surgery (2014).  There was significant improvement following previous treatment.    Pertinent medical history includes:  Depression, history of fractures, numbness/tingling, overweight, sleep disorder/apnea, smoking, thyroid problems and mental illness.  Medical allergies: no.  Other surgeries include:  Other and orthopedic surgery (lumbar and gall bladder removal).  Current medications:  None as reported by the patient.  Current occupation is [employer].  Patient is currently not working due to present treatment problem.      Barriers include:  None as reported by the patient.    Red flags:  Calf pain, swelling, warmth and pain at night/rest.                        Objective:  Standing Alignment:        Lumbar deviations  alignment: decreased lumbar lordosis.                Flexibility/Screens:       Lower Extremity:  Decreased left lower extremity flexibility:Hamstrings                 Lumbar/SI Evaluation  ROM:    AROM Lumbar:   Flexion:        50% pain  Ext:                    40% decreases pain   Side Bend:        Left:     Right:   Rotation:           Left:     Right:   Side Glide:        Left:     Right:         Strength: weak lower abdominals and pelvic stabilizers, pt unable to assume prone position  Lumbar Myotomes:          L5 (Great Toe Ext): Left: 4        Lumbar DTR's:  normal        Lumbar Dermtomes:              L5 Left:  Hypo-light touch         Neural Tension/Mobility:    Left side:  SLR and SLR w/DF positive.     Lumbar Palpation:  Palpation (lumbar): point tenderness L5 spinous process and adjacent lumbar parapsinals.                                                         General     ROS    Assessment/Plan:    Patient is a 27 year old female with lumbar complaints.    Patient has the following significant findings with corresponding treatment plan.                Diagnosis 1:  Left L5-S1 disc herniation   Pain -  hot/cold therapy, self management, education and home program  Decreased ROM/flexibility - manual therapy, therapeutic exercise, therapeutic activity and home program  Decreased strength - therapeutic exercise, therapeutic activities and home program    Therapy Evaluation Codes:   1) History comprised of:   Personal factors that impact the plan of care:      Time since onset of symptoms.    Comorbidity factors that impact the plan of care are:      Depression, Mental illness, Numbness/tingling, Overweight, Pain at night/rest, Sleep disorder/apnea, Smoking and Weakness.     Medications impacting care: None.  2) Examination of Body Systems comprised of:   Body structures and functions that impact the plan of care:      Lumbar spine, Pelvis and Sacral illiac joint.   Activity limitations that impact the plan  of care are:      Bathing, Bending, Driving, Dressing, Lifting, Sitting, Standing, Walking, Sleeping and Laying down.  3) Clinical presentation characteristics are:   Evolving/Changing.  4) Decision-Making    Moderate complexity using standardized patient assessment instrument and/or measureable assessment of functional outcome.  Cumulative Therapy Evaluation is: Moderate complexity.    Previous and current functional limitations:  (See Goal Flow Sheet for this information)    Short term and Long term goals: (See Goal Flow Sheet for this information)     Communication ability:  Patient appears to be able to clearly communicate and understand verbal and written communication and follow directions correctly.  Treatment Explanation - The following has been discussed with the patient:   RX ordered/plan of care  Anticipated outcomes  Possible risks and side effects  This patient would benefit from PT intervention to resume normal activities.   Rehab potential is good.    Frequency:  1 X week, once daily  Duration:  for 6 weeks  Discharge Plan:  Achieve all LTG.  Independent in home treatment program.  Reach maximal therapeutic benefit.    Please refer to the daily flowsheet for treatment today, total treatment time and time spent performing 1:1 timed codes.

## 2018-06-06 NOTE — LETTER
DEPARTMENT OF HEALTH AND HUMAN SERVICES  [facility] FOR [facility] & MEDICA[facility] SERVICES    PLAN/UPDATED PLAN OF PROGRESS FOR OUTPATIENT REHABILITATION    PATIENTS NAME:  Yu Machado   : 1990  [facility]BER:    [id number]  [id number]   PROVIDER NAME: [facility]  MEDICAL RECORD NUMBER: [id number]   START OF CARE DATE:  SOC Date: 18   TYPE:  PT  PRIMARY/TREATMENT DIAGNOSIS: (Pertinent Medical Diagnosis)  Lumbar disc herniation    VISITS FROM START OF CARE:  Rxs Used: 1     EVALUATION SUMMARY    Subjective:  Patient is a 27 year old female presenting with rehab back hpi. The history is provided by the patient.   Yu Machado is a 27 year old female with a lumbar condition.  Condition occurred with:  Insidious onset.  Condition occurred: for unknown reasons.  This is a chronic condition  History of lumbar pain since  secondary to being pregnant. Pt has surgery in  ( pt can't recall what type of surgery she had. ) pt noted onset of lumbar pain radiating into the left lower extremity 2-3 months ago of unknown etiology. Recent MRI shows L5-S1 left disc herniation. Pt referred by MD for physical therapy on 18.    Patient reports pain:  Lumbar spine left and lumbar spine right (L>R).  Radiates to:  Foot left, gluteals left, knee left, lower leg left and thigh left.  Pain is described as aching, sharp and burning and is constant and reported as 10/10.  Associated symptoms:  Tingling, numbness, loss of motion/stiffness and loss of strength. Pain is worse in the A.M..  Symptoms are exacerbated by bending, twisting, carrying, lifting, sitting and standing and relieved by rest.  Since onset symptoms are gradually worsening.  Special tests:  MRI (see report).  Previous treatment includes surgery ().  There was significant improvement following previous treatment.    Pertinent medical history includes:  Depression, history of fractures, numbness/tingling, overweight, sleep  disorder/apnea, smoking, thyroid problems and mental illness.  Medical allergies: no.  Other surgeries include:  Other and orthopedic surgery (lumbar and gall bladder removal).  Current medications:  None as reported by the patient.  Current occupation is [employer].  Patient is currently not working due to present treatment problem.    Barriers include:  None as reported by the patient.  Red flags:  Calf pain, swelling, warmth and pain at night/rest.                Objective:  Standing Alignment:    Lumbar deviations alignment: decreased lumbar lordosis.  Flexibility/Screens:   Lower Extremity:  Decreased left lower extremity flexibility:Hamstrings  Lumbar/SI Evaluation  ROM:    AROM Lumbar:   Flexion:        50% pain  Ext:                    40% decreases pain   Side Bend:        Left:     Right:   Rotation:           Left:     Right:   Side Glide:        Left:     Right:   Strength: weak lower abdominals and pelvic stabilizers, pt unable to assume prone position  PATIENTS NAME:  Yu Machado   : 1990      Lumbar Myotomes:    L5 (Great Toe Ext): Left: 4      Lumbar DTR's:  normal  Lumbar Dermtomes:    L5 Left:  Hypo-light touch       Neural Tension/Mobility:    Left side:  SLR and SLR w/DF positive.   Lumbar Palpation:  Palpation (lumbar): point tenderness L5 spinous process and adjacent lumbar parapsinals.    Assessment/Plan:    Patient is a 27 year old female with lumbar complaints.    Patient has the following significant findings with corresponding treatment plan.                Diagnosis 1:  Left L5-S1 disc herniation   Pain -  hot/cold therapy, self management, education and home program  Decreased ROM/flexibility - manual therapy, therapeutic exercise, therapeutic activity and home program  Decreased strength - therapeutic exercise, therapeutic activities and home program    Therapy Evaluation Codes:   1) History comprised of:   Personal factors that impact the plan of care:      Time since onset of  symptoms.    Comorbidity factors that impact the plan of care are:      Depression, Mental illness, Numbness/tingling, Overweight, Pain at night/rest, Sleep disorder/apnea, Smoking and Weakness.     Medications impacting care: None.  2) Examination of Body Systems comprised of:   Body structures and functions that impact the plan of care:      Lumbar spine, Pelvis and Sacral illiac joint.   Activity limitations that impact the plan of care are:      Bathing, Bending, Driving, Dressing, Lifting, Sitting, Standing, Walking, Sleeping and Laying down.  3) Clinical presentation characteristics are:   Evolving/Changing.  4) Decision-Making    Moderate complexity using standardized patient assessment instrument and/or measureable assessment of functional outcome.  Cumulative Therapy Evaluation is: Moderate complexity.    Previous and current functional limitations:  (See Goal Flow Sheet for this information)    Short term and Long term goals: (See Goal Flow Sheet for this information)     Communication ability:  Patient appears to be able to clearly communicate and understand verbal and written communication and follow directions correctly.  Treatment Explanation - The following has been discussed with the patient:   RX ordered/plan of care  Anticipated outcomes  Possible risks and side effects  This patient would benefit from PT intervention to resume normal activities.   Rehab potential is good.        PATIENTS NAME:  Yu Machado   : 1990          Frequency:  1 X week, once daily  Duration:  for 6 weeks  Discharge Plan:  Achieve all LTG.  Independent in home treatment program.  Reach maximal therapeutic benefit.  .             Caregiver Signature/Credentials _____________________________ Date ________       Treating Provider: Bill Dallas, PT   I have reviewed and certified the need for these services and plan of treatment while under my care.        PHYSICIAN'S SIGNATURE:    "_____________________________________ Date___________     Cheri Martinez PA-C    Certification period:  Beginning of Cert date period: 06/06/18 to  End of Cert period date: 09/03/18     Functional Level Progress Report: Please see attached \"Goal Flow sheet for Functional level.\"    ____X____ Continue Services or       ________ DC Services                Service dates: From  SOC Date: 06/06/18 date to present                         "

## 2018-06-12 ENCOUNTER — THERAPY VISIT (OUTPATIENT)
Dept: PHYSICAL THERAPY | Facility: CLINIC | Age: 28
End: 2018-06-12
Attending: PHYSICIAN ASSISTANT
Payer: MEDICAID

## 2018-06-12 DIAGNOSIS — M51.26 LUMBAR DISC HERNIATION: ICD-10-CM

## 2018-06-12 PROCEDURE — 97014 ELECTRIC STIMULATION THERAPY: CPT | Mod: GP | Performed by: PHYSICAL THERAPIST

## 2018-06-12 PROCEDURE — 97110 THERAPEUTIC EXERCISES: CPT | Mod: GP | Performed by: PHYSICAL THERAPIST

## 2018-06-12 PROCEDURE — 97010 HOT OR COLD PACKS THERAPY: CPT | Mod: GP | Performed by: PHYSICAL THERAPIST

## 2018-06-12 NOTE — PROGRESS NOTES
Subjective:  HPI                    Objective:  System    Physical Exam    General     ROS    Assessment/Plan:    SUBJECTIVE  Subjective changes as noted by pt:  Pt notes continued pain radiating into the lower extremities. Ice and extension give temporary relief     Current pain level: 6/10     Changes in function:  Pt notes increased pain as the day progresses.      Adverse reaction to treatment or activity:  None    OBJECTIVE  Changes in objective findings:  Pt still note tolerating prone position. Progressed with hamstring strengthening and lower abdominal strengthening.         ASSESSMENT  Yu continues to require intervention to meet STG and LTG's: PT  Patient is progressing as expected.  Response to therapy has shown lack of progress in  pain level and flexibility  Progress made towards STG/LTG?  Pt progressing slowly towards goals    PLAN  Current treatment program is being advanced to more complex exercises.    PTA/ATC plan:  N/A    Please refer to the daily flowsheet for treatment today, total treatment time and time spent performing 1:1 timed codes.

## 2018-06-15 ENCOUNTER — OFFICE VISIT (OUTPATIENT)
Dept: PSYCHIATRY | Facility: CLINIC | Age: 28
End: 2018-06-15
Attending: NURSE PRACTITIONER
Payer: MEDICAID

## 2018-06-15 VITALS
BODY MASS INDEX: 42.34 KG/M2 | HEART RATE: 102 BPM | SYSTOLIC BLOOD PRESSURE: 135 MMHG | DIASTOLIC BLOOD PRESSURE: 85 MMHG | WEIGHT: 239 LBS

## 2018-06-15 DIAGNOSIS — F41.0 ANXIETY ATTACK: ICD-10-CM

## 2018-06-15 DIAGNOSIS — F43.10 PTSD (POST-TRAUMATIC STRESS DISORDER): ICD-10-CM

## 2018-06-15 PROCEDURE — G0463 HOSPITAL OUTPT CLINIC VISIT: HCPCS | Mod: ZF

## 2018-06-15 RX ORDER — HYDROXYZINE HYDROCHLORIDE 10 MG/1
TABLET, FILM COATED ORAL
Qty: 120 TABLET | Refills: 0 | Status: SHIPPED | OUTPATIENT
Start: 2018-06-15

## 2018-06-15 RX ORDER — PRAZOSIN HYDROCHLORIDE 1 MG/1
1 CAPSULE ORAL AT BEDTIME
Qty: 30 CAPSULE | Refills: 0 | Status: SHIPPED | OUTPATIENT
Start: 2018-06-15 | End: 2018-07-17 | Stop reason: SINTOL

## 2018-06-15 ASSESSMENT — PAIN SCALES - GENERAL: PAINLEVEL: MILD PAIN (3)

## 2018-06-15 NOTE — MR AVS SNAPSHOT
After Visit Summary   6/15/2018    Yu Machado    MRN: [id number]           Patient Information     Date Of Birth          1990        Visit Information        Provider Department      6/15/2018 1:00 PM Eugene Patino APRN CNP Psychiat[facility] Clinic        Today's Diagnoses     PTSD (post-traumatic stress disorder)        Anxiety attack           Follow-ups after your visit        Your next 10 appointments already scheduled     Jul 17, 2018  2:00 PM CDT   Adult Med Follow UP with DENISSE Ho CNP   Psychiatry Clinic ([facility])    [address]  [address] [id number] [phone]            Jul 18, 2018 11:30 AM CDT   [facility] with Bill Dallas, PT   [facility] ([facility] Shin  )    [address] [facility]  [address] [place] [address]   [phone]              Who to contact     Please call your clinic at [phone] to:    Ask questions about your health    Make or cancel appointments    Discuss your medicines    Learn about your test results    Speak to your doctor            Additional Information About Your Visit        Care EveryWhere ID     This is your Care EveryWhere ID. This could be used by other organizations to access your [facility] medical records  [id number]        Your Vitals Were     Pulse BMI (Body Mass Index)                102 42.34 kg/m2           Blood Pressure from Last 3 Encounters:   07/07/18 98/64   06/15/18 135/85   06/04/18 123/71    Weight from Last 3 Encounters:   06/15/18 108.4 kg (239 lb)   06/04/18 110.7 kg (244 lb)   05/30/18 110.3 kg (243 lb 3.2 oz)              Today, you had the following     No orders found for display         Today's Medication Changes          These changes are accurate as of 6/15/18 11:59 PM.  If you have any questions, ask your nurse or doctor.               Start taking these medicines.        Dose/Directions    hydrOXYzine 10 MG tablet   Commonly  known as:  ATARAX   Used for:  Anxiety attack, PTSD (post-traumatic stress disorder)   Started by:  Eugene Patino APRN CNP        Take 1-2 tablets (10-20mg) up to 3 times per day as needed for anxity   Quantity:  120 tablet   Refills:  0       prazosin 1 MG capsule   Commonly known as:  MINIPRESS   Used for:  PTSD (post-traumatic stress disorder)   Started by:  Eugene Patino APRN CNP        Dose:  1 mg   Take 1 capsule (1 mg) by mouth At Bedtime   Quantity:  30 capsule   Refills:  0       sertraline 50 MG tablet   Commonly known as:  ZOLOFT   Used for:  PTSD (post-traumatic stress disorder)   Started by:  Eugene Patino APRN CNP        Take 1/2 tablet (25mg) for 1 week then increase to 1 tablet (50mg) daily   Quantity:  30 tablet   Refills:  0            Where to get your medicines      These medications were sent to [facility] [address] [place] [address] [address] [place] [address]  [address] [facility] [id number]     Phone:  [phone]     hydrOXYzine 10 MG tablet    prazosin 1 MG capsule    sertraline 50 MG tablet                Primary Care Provider Office Phone # Fax #    [facility] [place] [phone]       [address] [place] [address]        Equal Access to Services     LITZY PORTER AH: Hadii ezequiel castle hadasho Soomaali, waaxda luqadaha, qaybta kaalmada adeegyada, waxay shinin haydharmesh perea. So [facility] [phone].    ATENCIÓN: Si habla español, tiene a mckeon disposición servicios gratuitos de asistencia lingüística. [facility] [phone].    We comply with applicable federal civil rights laws and [place] laws. We do not discriminate on the basis of race, color, national origin, age, disability, sex, sexual orientation, or gender identity.            Thank you!     Thank you for choosing PS[facility]IATRY CLINIC  for your care. Our goal is always to provide you with excellent care. Hearing back from our patients is one way we can continue to improve our  services. Please take a few minutes to complete the written survey that you may receive in the mail after your visit with us. Thank you!             Your Updated Medication List - Protect others around you: Learn how to safely use, store and throw away your medicines at www.disposemymeds.org.          This list is accurate as of 6/15/18 11:59 PM.  Always use your most recent med list.                   Brand Name Dispense Instructions for use Diagnosis    diazepam 10 MG tablet    VALIUM    2 tablet    Take 1 tablet (10 mg) by mouth every 6 hours as needed for anxiety or sleep Take 30-60 minutes before procedure.  Do not operate a vehicle after taking this medication.    Lumbar radiculopathy       hydrOXYzine 10 MG tablet    ATARAX    120 tablet    Take 1-2 tablets (10-20mg) up to 3 times per day as needed for anxity    Anxiety attack, PTSD (post-traumatic stress disorder)       LORazepam 0.5 MG tablet    ATIVAN    20 tablet    Take 1 tablet (0.5 mg) by mouth nightly as needed for anxiety    Anxiety attack, PTSD (post-traumatic stress disorder), Primary insomnia       prazosin 1 MG capsule    MINIPRESS    30 capsule    Take 1 capsule (1 mg) by mouth At Bedtime    PTSD (post-traumatic stress disorder)       sertraline 50 MG tablet    ZOLOFT    30 tablet    Take 1/2 tablet (25mg) for 1 week then increase to 1 tablet (50mg) daily    PTSD (post-traumatic stress disorder)
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
## 2018-06-19 ENCOUNTER — THERAPY VISIT (OUTPATIENT)
Dept: PHYSICAL THERAPY | Facility: CLINIC | Age: 28
End: 2018-06-19
Attending: PHYSICIAN ASSISTANT
Payer: MEDICAID

## 2018-06-19 DIAGNOSIS — M51.26 LUMBAR DISC HERNIATION: ICD-10-CM

## 2018-06-19 PROCEDURE — 97110 THERAPEUTIC EXERCISES: CPT | Mod: GP | Performed by: PHYSICAL THERAPIST

## 2018-06-19 PROCEDURE — 97010 HOT OR COLD PACKS THERAPY: CPT | Mod: GP | Performed by: PHYSICAL THERAPIST

## 2018-06-19 PROCEDURE — 97014 ELECTRIC STIMULATION THERAPY: CPT | Mod: GP | Performed by: PHYSICAL THERAPIST

## 2018-06-19 NOTE — PROGRESS NOTES
Subjective:  HPI                    Objective:  System    Physical Exam    General     ROS    Assessment/Plan:    SUBJECTIVE  Subjective changes as noted by pt:  Pt notes decrease in pain     Current pain level: 4/10     Changes in function:  Pt notes increased ease sleeping, getting out of bed and walking     Adverse reaction to treatment or activity:  None    OBJECTIVE  Changes in objective findings:  Pain with hamstring stretch, substituted GS stretch. Pt demonstrates improved lumbar extension        ASSESSMENT  Yu continues to require intervention to meet STG and LTG's: PT  Patient's symptoms are resolving.  Response to therapy has shown an improvement in  pain level and function  Progress made towards STG/LTG?  Yes,     PLAN  Current treatment program is being advanced to more complex exercises.    PTA/ATC plan:  N/A    Please refer to the daily flowsheet for treatment today, total treatment time and time spent performing 1:1 timed codes.

## 2018-07-03 ENCOUNTER — THERAPY VISIT (OUTPATIENT)
Dept: PHYSICAL THERAPY | Facility: CLINIC | Age: 28
End: 2018-07-03
Payer: COMMERCIAL

## 2018-07-03 DIAGNOSIS — M51.26 LUMBAR DISC HERNIATION: ICD-10-CM

## 2018-07-03 PROCEDURE — 97110 THERAPEUTIC EXERCISES: CPT | Mod: GP | Performed by: PHYSICAL THERAPIST

## 2018-07-03 PROCEDURE — 97014 ELECTRIC STIMULATION THERAPY: CPT | Mod: GP | Performed by: PHYSICAL THERAPIST

## 2018-07-03 NOTE — PROGRESS NOTES
Subjective:  HPI                    Objective:  System    Physical Exam    General     ROS    Assessment/Plan:    SUBJECTIVE  Subjective changes as noted by pt:  Pt noted increased pain 4 days ago after starting a new job . Pt notes bilateral lumbar pain radiating into the left lower extremity to mid calf.      Current pain level: 7/10     Changes in function:  Pt notes pain with bending.      Adverse reaction to treatment or activity:  None    OBJECTIVE  Changes in objective findings:  Standing extension helps centralize symptoms. Trial of prone positioning to decrease symptoms.         ASSESSMENT  Yu continues to require intervention to meet STG and LTG's: PT  Patient has experienced an exacerbation of symptoms.  Response to therapy has shown a worsening of  pain level  Progress made towards STG/LTG?  Pt had been progressing until recent set back.     PLAN  Current treatment program is being advanced to more complex exercises.    PTA/ATC plan:  N/A    Please refer to the daily flowsheet for treatment today, total treatment time and time spent performing 1:1 timed codes.

## 2018-07-07 ENCOUNTER — OFFICE VISIT (OUTPATIENT)
Dept: URGENT CARE | Facility: URGENT CARE | Age: 28
End: 2018-07-07
Payer: COMMERCIAL

## 2018-07-07 VITALS
SYSTOLIC BLOOD PRESSURE: 98 MMHG | HEART RATE: 64 BPM | DIASTOLIC BLOOD PRESSURE: 64 MMHG | RESPIRATION RATE: 16 BRPM | TEMPERATURE: 97.4 F

## 2018-07-07 DIAGNOSIS — M79.18 PAIN IN RIGHT BUTTOCK: Primary | ICD-10-CM

## 2018-07-07 PROCEDURE — 99213 OFFICE O/P EST LOW 20 MIN: CPT | Performed by: FAMILY MEDICINE

## 2018-07-07 NOTE — PATIENT INSTRUCTIONS
follow up with a primary care provider for further evaluation.      Tylenol, Ibuprofen for the pain    Sit on cushions while at home.

## 2018-07-07 NOTE — PROGRESS NOTES
SUBJECTIVE:   Yu Machado is a 27 year old female presenting with a chief complaint of a painful bump on the right lower back sometimes radiating to the right abdomen when leaning to the right and when sitting down.   This bump is also painful with prolonged standing.   Onset of symptoms was a few weeks ago.  Course of illness is worsening.  .    Current and Associated symptoms: as listed pernell.   Treatment measures tried include none. ..    Past Medical History:   Diagnosis Date     Chronic low back pain      Obesity      Current Outpatient Prescriptions   Medication Sig Dispense Refill     hydrOXYzine (ATARAX) 10 MG tablet Take 1-2 tablets (10-20mg) up to 3 times per day as needed for anxity 120 tablet 0     prazosin (MINIPRESS) 1 MG capsule Take 1 capsule (1 mg) by mouth At Bedtime 30 capsule 0     sertraline (ZOLOFT) 50 MG tablet Take 1/2 tablet (25mg) for 1 week then increase to 1 tablet (50mg) daily 30 tablet 0     diazepam (VALIUM) 10 MG tablet Take 1 tablet (10 mg) by mouth every 6 hours as needed for anxiety or sleep Take 30-60 minutes before procedure.  Do not operate a vehicle after taking this medication. (Patient not taking: Reported on 6/15/2018) 2 tablet 0     LORazepam (ATIVAN) 0.5 MG tablet Take 1 tablet (0.5 mg) by mouth nightly as needed for anxiety (Patient not taking: Reported on 6/4/2018) 20 tablet 0     Social History   Substance Use Topics     Smoking status: Current Every Day Smoker     Smokeless tobacco: Never Used     Alcohol use Yes       ROS:  Review of systems negative except as stated above.    OBJECTIVE:  BP 98/64  Pulse 64  Temp 97.4  F (36.3  C) (Tympanic)  Resp 16  GENERAL APPEARANCE: healthy, alert and patient appears very anxious.   RIGHT BUTTOCK:  There is fibrous, rubbery lesion about 1 cm diameter with tenderness deep below the skin of the right superior buttock.  This lesion seems to be on the surface of the right side of the sacrum/sacroiliac joint area.       ASSESSMENT:  Painful Right Buttock Lesion.  No evidence for infection.  Mass feels rubbery and could represent fibrosis vs an isolated lymph node    PLAN:  follow up with a primary care provider for further evaluation  Ibuprofen, tylenol  Sit on cushions to decrease some of the pain.       Mike Santos MD

## 2018-07-07 NOTE — MR AVS SNAPSHOT
After Visit Summary   7/7/2018    Yu Machado    MRN: [id number]           Patient Information     Date Of Birth          1990        Visit Information        Provider Department      7/7/2018 3:05 PM Mike Santos MD [facility]        Care Instructions    follow up with a primary care provider for further evaluation.      Tylenol, Ibuprofen for the pain    Sit on cushions while at home.                   Follow-ups after your visit        Your next 10 appointments already scheduled     Jul 10, 2018 11:30 AM CDT   [facility] Bill [facility], PT   [facility] [place] [facility] ([facility] [place]  )    [address]  [address]  [facility] [address]   [phone]            Jul 17, 2018  2:00 PM CDT   Adult Med Follow UP with DENISSE [facility]   Psychiatry Clinic ([facility])    [place] [id number]  [address] [address] [phone]              Who to contact     If you have questions or need follow up information about today's clinic visit or your schedule please contact [facility] directly at [phone].  Normal or non-critical lab and imaging results will be communicated to you by MyChart, letter or phone within 4 business days after the clinic has received the results. If you do not hear from us within 7 days, please contact the clinic through MyChart or phone. If you have a critical or abnormal lab result, we will notify you by phone as soon as possible.  Submit refill requests through [employer] or call your pharmacy and they will forward the refill request to us. Please allow 3 business days for your refill to be completed.          Additional Information About Your Visit        Care EveryWhere ID     This is your Care EveryWhere ID. This could be used by other organizations to access your [place] medical records  [id number]        Your Vitals Were     Pulse Temperature Respirations              64 97.4  F (36.3  C) (Tympanic) 16          Blood Pressure from Last 3 Encounters:   07/07/18 98/64   06/04/18 123/71   05/30/18 112/76    Weight from Last 3 Encounters:   06/04/18 244 lb (110.7 kg)   05/30/18 243 lb 3.2 oz (110.3 kg)   05/21/18 248 lb (112.5 kg)              Today, you had the following     No orders found for display       Primary Care Provider Office Phone # Fax #    [facility] [phone]       [address] [place] [address]        Equal Access to Services     PIYUSH PORTER : Hadii ezequiel castle hadportiao Soshari, waaxda luqadaha, qaybta kaalmada ademartínezyada, teodoro perea. So [facility] [phone].    ATENCIÓN: Si habla español, tiene a mckeon disposición servicios gratuitos de asistencia lingüística. [facility] [phone].    We comply with applicable federal civil rights laws and [place] laws. We do not discriminate on the basis of race, color, national origin, age, disability, sex, sexual orientation, or gender identity.            Thank you!     Thank you for choosing [facility]  for your care. Our goal is always to provide you with excellent care. Hearing back from our patients is one way we can continue to improve our services. Please take a few minutes to complete the written survey that you may receive in the mail after your visit with us. Thank you!             Your Updated Medication List - Protect others around you: Learn how to safely use, store and throw away your medicines at www.disposemymeds.org.          This list is accurate as of 7/7/18  3:54 PM.  Always use your most recent med list.                   Brand Name Dispense Instructions for use Diagnosis    diazepam 10 MG tablet    VALIUM    2 tablet    Take 1 tablet (10 mg) by mouth every 6 hours as needed for anxiety or sleep Take 30-60 minutes before procedure.  Do not operate a vehicle after taking this medication.    Lumbar radiculopathy        hydrOXYzine 10 MG tablet    ATARAX    120 tablet    Take 1-2 tablets (10-20mg) up to 3 times per day as needed for anxity    Anxiety attack, PTSD (post-traumatic stress disorder)       LORazepam 0.5 MG tablet    ATIVAN    20 tablet    Take 1 tablet (0.5 mg) by mouth nightly as needed for anxiety    Anxiety attack, PTSD (post-traumatic stress disorder), Primary insomnia       prazosin 1 MG capsule    MINIPRESS    30 capsule    Take 1 capsule (1 mg) by mouth At Bedtime    PTSD (post-traumatic stress disorder)       sertraline 50 MG tablet    ZOLOFT    30 tablet    Take 1/2 tablet (25mg) for 1 week then increase to 1 tablet (50mg) daily    PTSD (post-traumatic stress disorder)

## 2018-07-12 ASSESSMENT — PATIENT HEALTH QUESTIONNAIRE - PHQ9: SUM OF ALL RESPONSES TO PHQ QUESTIONS 1-9: 24

## 2018-07-17 ENCOUNTER — OFFICE VISIT (OUTPATIENT)
Dept: PSYCHIATRY | Facility: CLINIC | Age: 28
End: 2018-07-17
Attending: NURSE PRACTITIONER
Payer: COMMERCIAL

## 2018-07-17 VITALS
WEIGHT: 247 LBS | BODY MASS INDEX: 43.75 KG/M2 | HEART RATE: 93 BPM | DIASTOLIC BLOOD PRESSURE: 74 MMHG | SYSTOLIC BLOOD PRESSURE: 117 MMHG

## 2018-07-17 DIAGNOSIS — F41.9 ANXIETY: ICD-10-CM

## 2018-07-17 DIAGNOSIS — F43.10 PTSD (POST-TRAUMATIC STRESS DISORDER): Primary | ICD-10-CM

## 2018-07-17 DIAGNOSIS — F33.1 MODERATE EPISODE OF RECURRENT MAJOR DEPRESSIVE DISORDER (H): ICD-10-CM

## 2018-07-17 PROCEDURE — G0463 HOSPITAL OUTPT CLINIC VISIT: HCPCS | Mod: ZF

## 2018-07-17 RX ORDER — PROPRANOLOL HYDROCHLORIDE 10 MG/1
10 TABLET ORAL 3 TIMES DAILY
Qty: 90 TABLET | Refills: 0 | Status: SHIPPED | OUTPATIENT
Start: 2018-07-17

## 2018-07-17 RX ORDER — MIRTAZAPINE 15 MG/1
15 TABLET, FILM COATED ORAL AT BEDTIME
Qty: 30 TABLET | Refills: 0 | Status: SHIPPED | OUTPATIENT
Start: 2018-07-17

## 2018-07-17 ASSESSMENT — PAIN SCALES - GENERAL: PAINLEVEL: MODERATE PAIN (4)

## 2018-07-17 NOTE — PATIENT INSTRUCTIONS
Thank you for coming to the PSYCHIATRY CLINIC.    Lab Testing:  If you had lab testing today and your results are reassuring or normal they will be mailed to you or sent through [employer] within 7 days.   If the lab tests need quick action we will call you with the results.  The phone number we will call with results is # [phone] (home) . If this is not the best number please call our clinic and change the number.    Medication Refills:  If you need any refills please call your pharmacy and they will contact us. Our fax number for refills is [phone]. Please allow three business for refill processing.   If you need to  your refill at a new pharmacy, please contact the new pharmacy directly. The new pharmacy will help you get your medications transferred.     Scheduling:  If you have any concerns about today's visit or wish to schedule another appointment please call our office during normal business hours [phone] (8-5:00 M-F)    Contact Us:  Please call [phone] during business hours (8-5:00 M-F).  If after clinic hours, or on the weekend, please call  [phone].    Financial Assistance [phone]  [employer] Billing [phone]  "[employer] Billing [phone]  Medical Records [phone]      MENTAL HEALTH CRISIS NUMBERS:  [facility]:   [facility] - [phone]   Crisis Residence [facility] - [phone]   Walk-In Counseling [facility] [phone]   COPE 24/7 [place] Mobile Team for Adults - [[phone]]; Child - [[phone]]     Crisis Connection - [phone]     [facility]:   [facility] - [phone]   Walk-in counseling [facility] - [phone]   Walk-in counseling [facility] - [phone]   Crisis Residence [facility] - [phone]   Urgent Care Adult Mental Health:   --Drop-in, 24/7 crisis line, and [place] Mobile Team [[phone]]    CRISIS TEXT  LINE: Text [phone] from anywhere, anytime, any crisis 24/7;    OR SEE www.crisistextline.org     Poison Control Center - [phone]    CHILD: [facility] needs assessment team - [phone]     [facility] - [phone]; or [employer] [facility] - [phone]    If you have a medical emergency please call 911or go to the nearest ER.                    _____________________________________________    Again thank you for choosing PSYCHIATRY CLINIC and please let us know how we can best partner with you to improve you and your family's health.  You may be receiving a survey in the mail regarding this appointment. We would love to have your feedback, both positive and negative, so please fill out the survey and return it using the provided envelope. The survey is done by an external company, so your answers are anonymous.

## 2018-07-17 NOTE — PROGRESS NOTES
Psychiatry Clinic Progress Note                                                                   Yu Machado is a 27 year old female who returns to the clinic for follow-up care  Therapist: referred to [facility] clinic.  Missed first appointment due to work   PCP: Sarkis, Daria Gill  Other Providers: None    Pertinent Background:  See previous notes.  Psych critical item history includes suicide attempt [single], suicidal ideation, SIB [cutting], trauma hx and psych hosp (<3).     Interim History                                                                                                        4, 4     The patient is a good historian, reports good treatment adherence and was last seen 6/15/18.  Since the last visit, Yu reports she stopped taking Prazosin after 2 weeks due to experiencing headaches each morning.  It was somewhat helpful in the management of nightmares.  When she stopped taking Prazosin the headaches ended.  Hydroxyzine has been helpful minimally helpful in managing anxiety with some side effects.  Sertraline has helped with mood but has led to emotional numbing and has led to sexual side effects.  She would like to switch medications to help manage depression, anxiety, insomnia, and PTSD symptoms      Recent Symptoms:   Depression:  suicidal ideation without plan, without intent, depressed mood, anhedonia, low energy, insomnia, appetite changes, feeling worthless and feeling hopeless  Elevated:  none  Psychosis:  none  Anxiety:  excessive worry and nervous/overwhelmed  Panic Attack:  blurred vision, dizziness, SOB and palpitations  Trauma Related:  intrusive memories, nightmares, flashbacks, avoidance, negative beliefs / emotions and startle response     Recent Substance Use:  No changes reported        Social/ Family History                                  [per patient report]                                 1ea,1ea   FINANCIAL SUPPORT- working       FEELS SAFE AT HOME-  Yes    Medical / Surgical History                                                                                                                  Patient Active Problem List   Diagnosis     Morbid obesity (H)     Lumbar disc herniation       Past Surgical History:   Procedure Laterality Date     CHOLECYSTECTOMY       Lumbar back surgery          Medical Review of Systems                                                                                                    2,10   Pregnant- No    Breastfeeding- No    Contraception- No  A comprehensive review of systems was performed and is negative other than noted in the HPI.  Allergy                                Review of patient's allergies indicates no known allergies.  Current Medications                                                                                                       Current Outpatient Prescriptions   Medication Sig Dispense Refill     hydrOXYzine (ATARAX) 10 MG tablet Take 1-2 tablets (10-20mg) up to 3 times per day as needed for anxity 120 tablet 0     prazosin (MINIPRESS) 1 MG capsule Take 1 capsule (1 mg) by mouth At Bedtime 30 capsule 0     sertraline (ZOLOFT) 50 MG tablet Take 1/2 tablet (25mg) for 1 week then increase to 1 tablet (50mg) daily 30 tablet 0     diazepam (VALIUM) 10 MG tablet Take 1 tablet (10 mg) by mouth every 6 hours as needed for anxiety or sleep Take 30-60 minutes before procedure.  Do not operate a vehicle after taking this medication. (Patient not taking: Reported on 6/15/2018) 2 tablet 0     LORazepam (ATIVAN) 0.5 MG tablet Take 1 tablet (0.5 mg) by mouth nightly as needed for anxiety (Patient not taking: Reported on 6/4/2018) 20 tablet 0     Vitals                                                                                                                       3, 3   /74  Pulse 93  Wt 112 kg (247 lb)  BMI 43.75 kg/m2   Mental Status Exam                                                                   "                  9, 14 cog gs     Alertness: alert  and oriented  Appearance: casually groomed  Behavior/Demeanor: cooperative, pleasant and calm, with good  eye contact   Speech: normal  Language: intact  Psychomotor: normal or unremarkable  Mood: \"ok\"  Affect: full range; was congruent to mood; was congruent to content  Thought Process/Associations: unremarkable  Thought Content:  Reports suicidal ideation without plan; without intent [details in Interim History];  Denies violent ideation  Perception:  Reports none;  Denies auditory hallucinations and visual hallucinations  Insight: good  Judgment: good  Cognition: (6) does  appear grossly intact; formal cognitive testing was not done  Gait/Station and/or Muscle Strength/Tone: unremarkable    Labs and Data                                                                                                                 Rating Scales:    PHQ9    PHQ9 Today:  17  PHQ-9 SCORE 6/15/2018   Total Score 24         Diagnosis and Assessment                                                                             m2, h3     Today the following issues were addressed:    1) PTSD  2) Major Depressive Disorder, recurrent, moderate-severe  3) Generalized Anxiety Disorder  4) Panic Disorder     [place] Prescription Monitoring Program [] was checked today:  indicates 6/4/18: Valium 10mg 2/1 dispensed. 5/21/18: Ativan 0.5mg 20/20 dispensed.     PSYCHOTROPIC DRUG INTERACTIONS: None clinically relevant    Plan                                                                                                                    m2, h3      1) Medication Management  Discontinue Sertraline due to side effects  Discontinue Prazosin due to side effects  Start Remeron 15mg at bedtime to help with sleep, anxiety and depression  Start Propranolol 10mg TID PRN to help with anxiety and PTSD    2) Therapy  Reschedule appointment.  Yu missed her initial therapy appointment due to work     RTC: " 1 month    CRISIS NUMBERS:   Provided routinely in AVS.    Treatment Risk Statement:  The patient understands the risks, benefits, adverse effects and alternatives. Agrees to treatment with the capacity to do so. No medical contraindications to treatment. Agrees to call clinic for any problems. The patient understands to call 911 or go to the nearest ED if life threatening or urgent symptoms occur.     PROVIDER:  DENISSE Varma CNP

## 2018-07-17 NOTE — MR AVS SNAPSHOT
After Visit Summary   7/17/2018    Yu Machado    MRN: [id number]           Patient Information     Date Of Birth          1990        Visit Information        Provider Department      7/17/2018 2:00 PM Eugene Patino APRN [facility]        Today's Diagnoses     PTSD (post-traumatic stress disorder)    -  1    Moderate episode of recurrent major depressive disorder (H)        Anxiety          Care Instructions    Thank you for coming to the PSYCHIATRY CLINIC.    Lab Testing:  If you had lab testing today and your results are reassuring or normal they will be mailed to you or sent through [employer] within 7 days.   If the lab tests need quick action we will call you with the results.  The phone number we will call with results is # [phone] (home) . If this is not the best number please call our clinic and change the number.    Medication Refills:  If you need any refills please call your pharmacy and they will contact us. Our fax number for refills is [phone]. Please allow three business for refill processing.   If you need to  your refill at a new pharmacy, please contact the new pharmacy directly. The new pharmacy will help you get your medications transferred.     Scheduling:  If you have any concerns about today's visit or wish to schedule another appointment please call our office during normal business hours [phone] (8-5:00 M-F)    Contact Us:  Please call [phone] during business hours (8-5:00 M-F).  If after clinic hours, or on the weekend, please call  [phone].    Financial Assistance [phone]  [facility] [phone]  [facility] [phone]  Medical Records [phone]      MENTAL HEALTH CRISIS NUMBERS:  [facility]:   [facility] - [phone]   Crisis Residence [facility] - [phone]   Walk-In Counseling Center [facility] - [phone]   COPE 24/7 [facility] Team for Adults -  [[phone]]; Child - [[phone]]     Crisis [facility] - [phone]     [facility]:   [facility] - [phone]   Walk-in counseling [facility] - [phone]   Walk-in counseling [facility] - [phone]   Crisis Residence [facility] - [phone]   Urgent Care Adult Mental Health:   --Drop-in, 24/7 crisis line, and [facility] Mobile Team [[phone]]    CRISIS TEXT LINE: Text [phone] from anywhere, anytime, any crisis 24/7;    OR SEE www.crisistextline.org     Poison Control Center - [phone]    CHILD: [facility] needs assessment team - [phone]     [facility] Lifeline - [phone]; or [employer] Lifeline - [phone]    If you have a medical emergency please call 911or go to the nearest ER.                    _____________________________________________    Again thank you for choosing PSYCHIATRY CLINIC and please let us know how we can best partner with you to improve you and your family's health.  You may be receiving a survey in the mail regarding this appointment. We would love to have your feedback, both positive and negative, so please fill out the survey and return it using the provided envelope. The survey is done by an external company, so your answers are anonymous.             Follow-ups after your visit        Your next 10 appointments already scheduled     Aug 07, 2018 11:30 AM CDT   [facility]   [facility] ([facility]  )    [address] [address] [address] [phone]            Aug 16, 2018 11:00 AM CDT   Adult Med Follow UP with [facility]   Psychiatry Clinic ([facility])    [place] [id number] [address] [address] [phone]              Who to contact     Please call your clinic at [phone] to:    Ask questions about your health    Make or cancel  appointments    Discuss your medicines    Learn about your test results    Speak to your doctor            Additional Information About Your Visit        Care EveryWhere ID     This is your Care EveryWhere ID. This could be used by other organizations to access your [facility] medical records  [id number]        Your Vitals Were     Pulse BMI (Body Mass Index)                93 43.75 kg/m2           Blood Pressure from Last 3 Encounters:   07/17/18 117/74   07/07/18 98/64   06/15/18 135/85    Weight from Last 3 Encounters:   07/17/18 112 kg (247 lb)   06/15/18 108.4 kg (239 lb)   06/04/18 110.7 kg (244 lb)              Today, you had the following     No orders found for display         Today's Medication Changes          These changes are accurate as of 7/17/18 11:59 PM.  If you have any questions, ask your nurse or doctor.               Start taking these medicines.        Dose/Directions    mirtazapine 15 MG tablet   Commonly known as:  REMERON   Used for:  PTSD (post-traumatic stress disorder), Moderate episode of recurrent major depressive disorder (H)   Started by:  Eugene Patino APRN CNP        Dose:  15 mg   Take 1 tablet (15 mg) by mouth At Bedtime   Quantity:  30 tablet   Refills:  0       propranolol 10 MG tablet   Commonly known as:  INDERAL   Used for:  Moderate episode of recurrent major depressive disorder (H), PTSD (post-traumatic stress disorder), Anxiety   Started by:  Eugene Patino APRN CNP        Dose:  10 mg   Take 1 tablet (10 mg) by mouth 3 times daily As needed for anxiety   Quantity:  90 tablet   Refills:  0         Stop taking these medicines if you haven't already. Please contact your care team if you have questions.     prazosin 1 MG capsule   Commonly known as:  MINIPRESS   Stopped by:  Eugene Patino APRN CNP           sertraline 50 MG tablet   Commonly known as:  ZOLOFT   Stopped by:  Eugene Patino APRN CNP                Where to get your medicines      These medications  were sent to [id number]  [place], [place] - [address]  [address], [place] [address]     Phone:  [phone]     mirtazapine 15 MG tablet    propranolol 10 MG tablet                Primary Care Provider Office Phone # Fax #    Daria [facility] [phone]       [address] [facility] [address]        Equal Access to Services     LITZY PORTER : Hadii aad ku hadasho Soomaali, waaxda luqadaha, qaybta kaalmada adeegyada, waxay idiin hayaan adeeg kharash la'francon ah. So [facility] [place] [phone].    ATENCIÓN: Si manav hensley, tiene a mckeon disposición servicios gratuitos de asistencia lingüística. Oanh al [phone].    We comply with applicable federal civil rights laws and [place] laws. We do not discriminate on the basis of race, color, national origin, age, disability, sex, sexual orientation, or gender identity.            Thank you!     Thank you for choosing PSYCHIATRY CLINIC  for your care. Our goal is always to provide you with excellent care. Hearing back from our patients is one way we can continue to improve our services. Please take a few minutes to complete the written survey that you may receive in the mail after your visit with us. Thank you!             Your Updated Medication List - Protect others around you: Learn how to safely use, store and throw away your medicines at www.disposemymeds.org.          This list is accurate as of 7/17/18 11:59 PM.  Always use your most recent med list.                   Brand Name Dispense Instructions for use Diagnosis    hydrOXYzine 10 MG tablet    ATARAX    120 tablet    Take 1-2 tablets (10-20mg) up to 3 times per day as needed for anxity    Anxiety attack, PTSD (post-traumatic stress disorder)       mirtazapine 15 MG tablet    REMERON    30 tablet    Take 1 tablet (15 mg) by mouth At Bedtime    PTSD (post-traumatic stress disorder), Moderate episode of recurrent major depressive disorder (H)       propranolol  10 MG tablet    INDERAL    90 tablet    Take 1 tablet (10 mg) by mouth 3 times daily As needed for anxiety    Moderate episode of recurrent major depressive disorder (H), PTSD (post-traumatic stress disorder), Anxiety

## 2018-07-18 ENCOUNTER — THERAPY VISIT (OUTPATIENT)
Dept: PHYSICAL THERAPY | Facility: CLINIC | Age: 28
End: 2018-07-18
Payer: COMMERCIAL

## 2018-07-18 DIAGNOSIS — M51.26 LUMBAR DISC HERNIATION: ICD-10-CM

## 2018-07-18 PROCEDURE — 97110 THERAPEUTIC EXERCISES: CPT | Mod: GP | Performed by: PHYSICAL THERAPIST

## 2018-07-18 PROCEDURE — 97014 ELECTRIC STIMULATION THERAPY: CPT | Mod: GP | Performed by: PHYSICAL THERAPIST

## 2018-07-18 NOTE — PROGRESS NOTES
Subjective:  HPI                    Objective:  System    Physical Exam    General     ROS    Assessment/Plan:    SUBJECTIVE  Subjective changes as noted by pt:  Pt notes decreased pain, pain has moved out of the leg     Current pain level: 3/10     Changes in function:  Pt notes increased ease with walking. Pt does not note pain with sitting but is sore after when she stands up     Adverse reaction to treatment or activity:  None    OBJECTIVE  Changes in objective findings:  Lumbar AROM flexion 80% tight in hamstrings,         ASSESSMENT  Yu continues to require intervention to meet STG and LTG's: PT  Patient's symptoms are resolving.  Response to therapy has shown an improvement in  pain level, flexibility and function  Progress made towards STG/LTG?  Yes,     PLAN  Current treatment program is being advanced to more complex exercises.    PTA/ATC plan:  N/A    Please refer to the daily flowsheet for treatment today, total treatment time and time spent performing 1:1 timed codes.

## 2018-07-24 ASSESSMENT — PATIENT HEALTH QUESTIONNAIRE - PHQ9: SUM OF ALL RESPONSES TO PHQ QUESTIONS 1-9: 18

## 2018-07-28 ENCOUNTER — OFFICE VISIT (OUTPATIENT)
Dept: URGENT CARE | Facility: URGENT CARE | Age: 28
End: 2018-07-28
Payer: COMMERCIAL

## 2018-07-28 VITALS
DIASTOLIC BLOOD PRESSURE: 81 MMHG | TEMPERATURE: 98.2 F | OXYGEN SATURATION: 96 % | BODY MASS INDEX: 43.05 KG/M2 | HEART RATE: 78 BPM | WEIGHT: 243 LBS | SYSTOLIC BLOOD PRESSURE: 114 MMHG

## 2018-07-28 DIAGNOSIS — N92.6 IRREGULAR PERIODS: ICD-10-CM

## 2018-07-28 DIAGNOSIS — H83.01 LABYRINTHITIS, RIGHT: Primary | ICD-10-CM

## 2018-07-28 DIAGNOSIS — R11.0 NAUSEA: ICD-10-CM

## 2018-07-28 LAB
BETA HCG QUAL IFA URINE: NEGATIVE
HGB BLD-MCNC: 14 G/DL (ref 11.7–15.7)

## 2018-07-28 PROCEDURE — 36415 COLL VENOUS BLD VENIPUNCTURE: CPT | Performed by: FAMILY MEDICINE

## 2018-07-28 PROCEDURE — 85018 HEMOGLOBIN: CPT | Performed by: FAMILY MEDICINE

## 2018-07-28 PROCEDURE — 84703 CHORIONIC GONADOTROPIN ASSAY: CPT | Performed by: FAMILY MEDICINE

## 2018-07-28 PROCEDURE — 84443 ASSAY THYROID STIM HORMONE: CPT | Performed by: FAMILY MEDICINE

## 2018-07-28 PROCEDURE — 99214 OFFICE O/P EST MOD 30 MIN: CPT | Performed by: FAMILY MEDICINE

## 2018-07-28 RX ORDER — MECLIZINE HYDROCHLORIDE 25 MG/1
25 TABLET ORAL EVERY 6 HOURS PRN
Qty: 30 TABLET | Refills: 1 | Status: SHIPPED | OUTPATIENT
Start: 2018-07-28

## 2018-07-28 NOTE — MR AVS SNAPSHOT
After Visit Summary   7/28/2018    Yu Machado    MRN: [id number]           Patient Information     Date Of Birth          1990        Visit Information        Provider Department      7/28/2018 7:45 PM Louise Garcia MD [facility]        Today's Diagnoses     Labyrinthitis, right    -  1    Nausea        Irregular periods          Care Instructions      Labyrinthitis    Labyrinthitis is the inflammation of part of the inner ear called the labyrinth. It usually affects only one ear. A nerve in the head called the 8th cranial nerve may also be inflamed. Labyrinthitis causes a sense of spinning and hearing loss. In most people these go away over time.  Understanding the inner ear  The inner ear has a system of fluid-filled tubes and sacs. This system is called the labyrinth. Inside the inner ear, the cochlea senses sound. The vestibular organs take in sense motion and changes in space. These create your sense of balance. The 8th cranial nerve (vestibulocochlear nerve) sends all of this information from the inner ear to the brain.  When 1 of the nerves or the labyrinth is infected, it can become inflamed. This can cause it to not work normally. It may cause hearing loss in 1 ear. The brain now has to make sense of the information that doesn't match between the normal nerve and the infected one. This causes a feeling that the world is spinning around you (vertigo).  What causes labyrinthitis?  A viral infection may cause the condition. The virus may have spread throughout your body. Or it may only affect the labyrinth and 8th cranial nerve. Usually only 1 nerve is affected. Viruses that can cause labyrinthitis include:    Herpes viruses    Influenza    Measles    Mumps    Rubella    Polio    Hepatitis    Winston-Jones    Varicella  Chronic bacterial infections of the middle ear can spread to the inner ear and cause labyrinthitis. In rare cases bacterial meningitis or  head trauma may cause labyrinthitis. In other cases the cause of labyrinthitis is not known.  Symptoms of labyrinthitis  Symptoms of labyrinthitis of may include:    A feeling of spinning (vertigo)    Dizziness    Lack of balance when walking    Nausea and vomiting    Trouble concentrating    Back-and-forth eye movements that you can't control (nystagmus)    Hearing loss    Ringing in the ears  Symptoms may range from mild to severe. Severe symptoms such as vertigo can cause problems with standing and walking. Symptoms may come on very quickly and start during routine daily activities. Or you may start to have symptoms when you wake up in the morning. In many people, these symptoms go away over several weeks. Or they can last longer.  Diagnosing labyrinthitis  Your healthcare provider will ask about your past health. You may also have a physical exam. This may include hearing and balance tests. It will also include an exam of your nervous system. There are no tests for labyrinthitis. But your healthcare provider may have you take an imaging test. Many health conditions can cause dizziness and vertigo. Your healthcare provider will need to make sure you don't have another condition that causes these symptoms, such as stroke.  You may have tests such as:    MRI, to check for a stroke    Electrocardiogram (ECG), to check for cardiovascular causes    Electronystagmography (ENG) or videonystagmography (VNG). Either of these records your eye movement to find where the problem is in your vestibular system. These tests can find the cause of a balance disorder.  Treatment for labyrinthitis  Treatment depends on your overall health and symptoms. Treatment for labyrinthitis may include:    Corticosteroids to help reduce inflammation of the nerve    Antiviral medicines    Antibiotics if you have signs of a bacterial infection    Medicines to take for a short time that control nausea and dizziness, such as diphenhydramine or  lorazepam  If your symptoms go away in a few weeks, you likely won't need other treatment. If you have symptoms that don't go away, you may need to do certain exercises. These are known as vestibular rehabilitation exercises. They are a form of physical therapy. These exercises may help your brain learn to adjust to the vestibular imbalance. A physical therapist will teach you the exercises. Then you can do them at home.  Possible complications of labyrinthitis  In most cases labyrinthitis does not cause any complications. In rare cases it may permanently damage the 8th cranial nerve. This can cause lasting problems with balance. It can also cause partial or total loss of hearing. You may need to use a hearing aid. Getting treated right away can help reduce your risk for these problems.     When to call the healthcare provider  Call your healthcare provider right away if you have any of these:    Symptoms that get worse, or don't get better with treatment    New symptoms    Date Last Reviewed: 11/1/2017 2000-2017 [employer]. [address] [place] [address]. All rights reserved. This information is not intended as a substitute for professional medical care. Always follow your healthcare professional's instructions.        Labyrinthitis    The inner ear is located behind the middle ear. It is part of the balance center of your body. When the inner ear becomes irritated or inflamed it causes a condition known as labyrinthitis. It may due to a viral infection, but often a cause is not found. Labyrinthitis causes sudden dizziness and balance problems. It often causes a feeling that you or the room is spinning (vertigo). You may feel nauseated or throw up. You may also feel a loss of balance when trying to walk. Head movement from side to side or changes in body position (from lying to sitting or standing) may worsen symptoms. You may have ringing in the ear. Hearing may also be affected.  An  episode of labyrinthitis may last seconds, minutes, or hours. It may never return. Or symptoms may recur off and on over several weeks or longer. In many cases, the problem is short-term and goes away when the inner ear issue resolves.  Home care    Take medicine as prescribed to relieve your symptoms. Unless another medicine was prescribed, you can try over-the-counter motion sickness pills. Note that these medicines may cause drowsiness.    If symptoms are severe, rest quietly in bed. Change positions slowly. There may be 1 position feels best, such as lying on 1 side or lying on your back with your head slightly raised on pillows. Until you have no symptoms, you are at a higher risk of falling. Let someone help you when you get up. Get rid of home hazards such as loose electrical cords and throw rugs. Don t walk in unfamiliar areas that are not lighted. Use night lights in bathrooms and kitchen areas.    Vestibular rehabilitation exercises are done by moving your head to help fix problems in the inner ear. If these exercises have been prescribed, do them as you have been instructed.    Do not drive or work with dangerous machinery until 1 week has passed without symptoms. Be careful when using stairs.  Follow-up care  Follow up with your healthcare provider or as advised by our staff.  When to seek medical advice  Call your healthcare provider for any of the following:    Symptoms that are not controlled by medicine     Symptoms that get worse    Repeated vomiting that is not relieved by medicine    Weakness that gets worse    Fainting    Headache or unusual drowsiness    Trouble with vision or speech    Trouble moving your face, arms, or legs    Hearing loss    Symptoms that last more than a few days  Date Last Reviewed: 11/1/2017 2000-2017 [employer]. [address] [place] [address], [place] [address]. All rights reserved. This information is not intended as a substitute for professional medical care.  Always follow your healthcare professional's instructions.                Follow-ups after your visit        Follow-up notes from your care team     Return if symptoms worsen or fail to improve.      Your next 10 appointments already scheduled     Aug 03, 2018  5:00 PM CDT   Adult Psychotherapy with Cathryn Lujan, PhD   Psychiatry Clinic ([facility])    [place] [id number]  [address] [id number] [phone]            Aug 07, 2018 11:30 AM CDT   [facility] with Bill Dallas, PT   [facility] ([facility]  )    [address]  [address]  [facility] [address]   [phone]            Aug 16, 2018 11:00 AM CDT   Adult Med Follow UP with DENISSE Ho CNP   Psychiatry Clinic ([facility])    [place] [id number]  [address] [id number] [phone]              Who to contact     If you have questions or need follow up information about today's clinic visit or your schedule please contact [facility] directly at [phone].  Normal or non-critical lab and imaging results will be communicated to you by MyChart, letter or phone within 4 business days after the clinic has received the results. If you do not hear from us within 7 days, please contact the clinic through MyChart or phone. If you have a critical or abnormal lab result, we will notify you by phone as soon as possible.  Submit refill requests through [email or web address] or call your pharmacy and they will forward the refill request to us. Please allow 3 business days for your refill to be completed.          Additional Information About Your Visit        Care EveryWhere ID     This is your Care EveryWhere ID. This could be used by other organizations to access your [place] medical records  [id number]        Your Vitals Were     Pulse Temperature Pulse Oximetry Breastfeeding? BMI (Body Mass Index)       78 98.2  F  (36.8  C) (Oral) 96% No 43.05 kg/m2        Blood Pressure from Last 3 Encounters:   07/28/18 114/81   07/07/18 98/64   06/04/18 123/71    Weight from Last 3 Encounters:   07/28/18 243 lb (110.2 kg)   06/04/18 244 lb (110.7 kg)   05/30/18 243 lb 3.2 oz (110.3 kg)              We Performed the Following     Beta HCG Qual, Urine - [facility] ([id number])     Hemoglobin     TSH          Today's Medication Changes          These changes are accurate as of 7/28/18  9:07 PM.  If you have any questions, ask your nurse or doctor.               Start taking these medicines.        Dose/Directions    meclizine 25 MG tablet   Commonly known as:  ANTIVERT   Used for:  Labyrinthitis, right   Started by:  Louise Garcia MD        Dose:  25 mg   Take 1 tablet (25 mg) by mouth every 6 hours as needed for dizziness   Quantity:  30 tablet   Refills:  1       prochlorperazine 5 MG/ML injection   Commonly known as:  COMPAZINE   Used for:  Nausea, Labyrinthitis, right   Started by:  Louise Garcia MD        Dose:  10 mg   Inject 2 mLs (10 mg) into the muscle once for 1 dose   Quantity:  2 mL   Refills:  0            Where to get your medicines      These medications were sent to [facility] [address] [place] [address] [address] [facility]  [address] [facility] [address]     Phone:  [phone]     meclizine 25 MG tablet         Some of these will need a paper prescription and others can be bought over the counter.  Ask your nurse if you have questions.     You don't need a prescription for these medications     prochlorperazine 5 MG/ML injection                Primary Care Provider Office Phone # Fax #    [facility] [phone]       [address] [place] [address]        Equal Access to Services     LITZY PORTER AH: Jolanta Lazar, waaxda luqadaha, qaybta kaalmada gemma, teodoro perea. So [facility]  [phone].    ATENCIÓN: Si manav hensley, tiene a mckeon disposición servicios gratuitos de asistencia lingüística. Oanh williamson [phone].    We comply with applicable federal civil rights laws and [place] laws. We do not discriminate on the basis of race, color, national origin, age, disability, sex, sexual orientation, or gender identity.            Thank you!     Thank you for choosing [facility]  for your care. Our goal is always to provide you with excellent care. Hearing back from our patients is one way we can continue to improve our services. Please take a few minutes to complete the written survey that you may receive in the mail after your visit with us. Thank you!             Your Updated Medication List - Protect others around you: Learn how to safely use, store and throw away your medicines at www.disposemymeds.org.          This list is accurate as of 7/28/18  9:07 PM.  Always use your most recent med list.                   Brand Name Dispense Instructions for use Diagnosis    hydrOXYzine 10 MG tablet    ATARAX    120 tablet    Take 1-2 tablets (10-20mg) up to 3 times per day as needed for anxity    Anxiety attack, PTSD (post-traumatic stress disorder)       meclizine 25 MG tablet    ANTIVERT    30 tablet    Take 1 tablet (25 mg) by mouth every 6 hours as needed for dizziness    Labyrinthitis, right       mirtazapine 15 MG tablet    REMERON    30 tablet    Take 1 tablet (15 mg) by mouth At Bedtime    PTSD (post-traumatic stress disorder), Moderate episode of recurrent major depressive disorder (H)       prochlorperazine 5 MG/ML injection    COMPAZINE    2 mL    Inject 2 mLs (10 mg) into the muscle once for 1 dose    Nausea, Labyrinthitis, right       propranolol 10 MG tablet    INDERAL    90 tablet    Take 1 tablet (10 mg) by mouth 3 times daily As needed for anxiety    Moderate episode of recurrent major depressive disorder (H), PTSD (post-traumatic stress disorder), Anxiety

## 2018-07-29 LAB — TSH SERPL DL<=0.005 MIU/L-ACNC: 2.82 MU/L (ref 0.4–4)

## 2018-07-29 NOTE — PROGRESS NOTES
SUBJECTIVE:   Yu Machado is a 27 year old female who complains of new onset positional vertigo since 3 pmt today.  Has not had this in the past. The patient denies any other symptoms of neurological impairment or TIA's; no amaurosis, diplopia, dysphasia, or unilateral disturbance of motor or sensory function. Feels a mild right sided temporal headache on and off. No hearing loss or tinnitus, nor head injury. No palpitations or syncope. Healthy in the past.  LMP: irreg.  The vertigo worsened after smoking a cigarette.      OBJECTIVE:  /81 (BP Location: Right arm, Patient Position: Chair, Cuff Size: Adult Large)  Pulse 78  Temp 98.2  F (36.8  C) (Oral)  Wt 243 lb (110.2 kg)  SpO2 96%  Breastfeeding? No  BMI 43.05 kg/m2   Appears well, in no apparent distress. Ears normal left TM, the right TM is mildly injected, not bulging. Neck has FROM but has some trapezius muscle and right sternocleidomastoid muscle tenderness. No adenopathy or masses in the neck or supraclavicular regions. Cranial nerves are normal.  PERRL. EOM's intact. BRADY. Mental status normal. Gait normal.   No internuclear ophthalmoplegia. Pulse regular.  Lungs: CTA B.  Rapid changes in position during the exam do precipitate brief dizziness without nystagmus.    Labs:   Results for orders placed or performed in visit on 07/28/18   Beta HCG Qual, Urine - [facility] ([id number])   Result Value Ref Range    Beta HCG Qual IFA Urine Negative NEG^Negative      Hemoglobin   Result Value Ref Range    Hemoglobin 14.0 11.7 - 15.7 g/dL      ASSESSMENT:  Encounter Diagnoses   Name Primary?     Labyrinthitis, right Yes     Nausea      Irregular periods         PLAN:  Compazine IM as per orders.    The patient is reassured that these symptoms do not appear to represent a serious or threatening condition. This is generally a self-limited temporary but uncomfortable situation. Rest, avoid potentially dangerous activities (such as driving or working  with machinery or at heights), use Meclizine prn. Asked to call if develops other symptoms, such as alterations of speech, swallowing, vision, motor or sensory systems, or if dizziness persists or worsens.  Louise Edmonds MD

## 2018-07-29 NOTE — NURSING NOTE
MEDICATION: Compazine 10mg  ROUTE: IM  SITE: [facility]  DOSE: 10mg  LOT #: [id number]  :  Theodore  EXPIRATION DATE:  8/19  NDC#: 28544-630-90    Rina Martins MA

## 2018-07-29 NOTE — PATIENT INSTRUCTIONS
Labyrinthitis    Labyrinthitis is the inflammation of part of the inner ear called the labyrinth. It usually affects only one ear. A nerve in the head called the 8th cranial nerve may also be inflamed. Labyrinthitis causes a sense of spinning and hearing loss. In most people these go away over time.  Understanding the inner ear  The inner ear has a system of fluid-filled tubes and sacs. This system is called the labyrinth. Inside the inner ear, the cochlea senses sound. The vestibular organs take in sense motion and changes in space. These create your sense of balance. The 8th cranial nerve (vestibulocochlear nerve) sends all of this information from the inner ear to the brain.  When 1 of the nerves or the labyrinth is infected, it can become inflamed. This can cause it to not work normally. It may cause hearing loss in 1 ear. The brain now has to make sense of the information that doesn't match between the normal nerve and the infected one. This causes a feeling that the world is spinning around you (vertigo).  What causes labyrinthitis?  A viral infection may cause the condition. The virus may have spread throughout your body. Or it may only affect the labyrinth and 8th cranial nerve. Usually only 1 nerve is affected. Viruses that can cause labyrinthitis include:    Herpes viruses    Influenza    Measles    Mumps    Rubella    Polio    Hepatitis    Winston-Jones    Varicella  Chronic bacterial infections of the middle ear can spread to the inner ear and cause labyrinthitis. In rare cases bacterial meningitis or head trauma may cause labyrinthitis. In other cases the cause of labyrinthitis is not known.  Symptoms of labyrinthitis  Symptoms of labyrinthitis of may include:    A feeling of spinning (vertigo)    Dizziness    Lack of balance when walking    Nausea and vomiting    Trouble concentrating    Back-and-forth eye movements that you can't control (nystagmus)    Hearing loss    Ringing in the ears  Symptoms  may range from mild to severe. Severe symptoms such as vertigo can cause problems with standing and walking. Symptoms may come on very quickly and start during routine daily activities. Or you may start to have symptoms when you wake up in the morning. In many people, these symptoms go away over several weeks. Or they can last longer.  Diagnosing labyrinthitis  Your healthcare provider will ask about your past health. You may also have a physical exam. This may include hearing and balance tests. It will also include an exam of your nervous system. There are no tests for labyrinthitis. But your healthcare provider may have you take an imaging test. Many health conditions can cause dizziness and vertigo. Your healthcare provider will need to make sure you don't have another condition that causes these symptoms, such as stroke.  You may have tests such as:    MRI, to check for a stroke    Electrocardiogram (ECG), to check for cardiovascular causes    Electronystagmography (ENG) or videonystagmography (VNG). Either of these records your eye movement to find where the problem is in your vestibular system. These tests can find the cause of a balance disorder.  Treatment for labyrinthitis  Treatment depends on your overall health and symptoms. Treatment for labyrinthitis may include:    Corticosteroids to help reduce inflammation of the nerve    Antiviral medicines    Antibiotics if you have signs of a bacterial infection    Medicines to take for a short time that control nausea and dizziness, such as diphenhydramine or lorazepam  If your symptoms go away in a few weeks, you likely won't need other treatment. If you have symptoms that don't go away, you may need to do certain exercises. These are known as vestibular rehabilitation exercises. They are a form of physical therapy. These exercises may help your brain learn to adjust to the vestibular imbalance. A physical therapist will teach you the exercises. Then you can do  them at home.  Possible complications of labyrinthitis  In most cases labyrinthitis does not cause any complications. In rare cases it may permanently damage the 8th cranial nerve. This can cause lasting problems with balance. It can also cause partial or total loss of hearing. You may need to use a hearing aid. Getting treated right away can help reduce your risk for these problems.     When to call the healthcare provider  Call your healthcare provider right away if you have any of these:    Symptoms that get worse, or don't get better with treatment    New symptoms    Date Last Reviewed: 11/1/2017 2000-2017 [employer]. [address] [place] [address]. All rights reserved. This information is not intended as a substitute for professional medical care. Always follow your healthcare professional's instructions.        Labyrinthitis    The inner ear is located behind the middle ear. It is part of the balance center of your body. When the inner ear becomes irritated or inflamed it causes a condition known as labyrinthitis. It may due to a viral infection, but often a cause is not found. Labyrinthitis causes sudden dizziness and balance problems. It often causes a feeling that you or the room is spinning (vertigo). You may feel nauseated or throw up. You may also feel a loss of balance when trying to walk. Head movement from side to side or changes in body position (from lying to sitting or standing) may worsen symptoms. You may have ringing in the ear. Hearing may also be affected.  An episode of labyrinthitis may last seconds, minutes, or hours. It may never return. Or symptoms may recur off and on over several weeks or longer. In many cases, the problem is short-term and goes away when the inner ear issue resolves.  Home care    Take medicine as prescribed to relieve your symptoms. Unless another medicine was prescribed, you can try over-the-counter motion sickness pills. Note that these  medicines may cause drowsiness.    If symptoms are severe, rest quietly in bed. Change positions slowly. There may be 1 position feels best, such as lying on 1 side or lying on your back with your head slightly raised on pillows. Until you have no symptoms, you are at a higher risk of falling. Let someone help you when you get up. Get rid of home hazards such as loose electrical cords and throw rugs. Don t walk in unfamiliar areas that are not lighted. Use night lights in bathrooms and kitchen areas.    Vestibular rehabilitation exercises are done by moving your head to help fix problems in the inner ear. If these exercises have been prescribed, do them as you have been instructed.    Do not drive or work with dangerous machinery until 1 week has passed without symptoms. Be careful when using stairs.  Follow-up care  Follow up with your healthcare provider or as advised by our staff.  When to seek medical advice  Call your healthcare provider for any of the following:    Symptoms that are not controlled by medicine     Symptoms that get worse    Repeated vomiting that is not relieved by medicine    Weakness that gets worse    Fainting    Headache or unusual drowsiness    Trouble with vision or speech    Trouble moving your face, arms, or legs    Hearing loss    Symptoms that last more than a few days  Date Last Reviewed: 11/1/2017 2000-2017 [employer]. [address] [place] [address], [place] [address]. All rights reserved. This information is not intended as a substitute for professional medical care. Always follow your healthcare professional's instructions.

## 2018-09-24 PROBLEM — M51.26 LUMBAR DISC HERNIATION: Status: RESOLVED | Noted: 2018-06-06 | Resolved: 2018-09-24

## 2018-09-24 NOTE — PROGRESS NOTES
Subjective:SUBJECTIVE  Subjective changes as noted by pt:  Pt notes decreased pain, pain has moved out of the leg     Current pain level: 3/10     Changes in function:  Pt notes increased ease with walking. Pt does not note pain with sitting but is sore after when she stands up     Adverse reaction to treatment or activity:  None     OBJECTIVE  Changes in objective findings:  Lumbar AROM flexion 80% tight in hamstrings,   HPI                    Objective:  System    Physical Exam    General     ROS    Assessment/Plan:    ASSESSMENT/PLAN  Updated problem list and treatment plan: Diagnosis 1:  Lumbar pain  Pain -  hot/cold therapy, self management, education, home program.  Decreased ROM/flexibility - therapeutic exercise, therapeutic activity and home program  Decreased strength - therapeutic exercise, therapeutic activities and home program  Progress toward STG/LTGs have been made:  Yes,   Assessment of Progress: The patient's condition has potential to improve.  Self Management Plans:  Patient has been instructed in a home treatment program.  I have re-evaluated this patient and find that the nature, scope, duration and intensity of the therapy is appropriate for the medical condition of the patient.  Yu continues to require the following intervention to meet STG and LT's:  PT    Recommendations:  Pt has not returned for treatment since 7-18-18. Pt discharged at this time.      Please refer to the daily flowsheet for treatment today, total treatment time and time spent performing 1:1 timed codes.

## 2018-11-01 ENCOUNTER — TELEPHONE (OUTPATIENT)
Dept: FAMILY MEDICINE | Facility: CLINIC | Age: 28
End: 2018-11-01

## 2018-11-01 NOTE — TELEPHONE ENCOUNTER
Panel Management Review      Patient has the following on her problem list: None      Composite cancer screening  Chart review shows that this patient is due/due soon for the following Pap Smear  Summary:    Patient is due/failing the following:   PAP    Action needed:   Patient needs referral/order: OB/GYN  OR patient to update with needed information    Type of outreach:    Sent letter.    Questions for provider review:    None                                                                                                                                    Tori Souza RT (R)

## 2018-11-01 NOTE — LETTER
[facility]  [address] [facility]  [address]  [place]  [address]  Tel: [phone]    November 1, 2018    Yu Machado  [address] [place] [address] [place] [address]        Dear Ms. Machado,    In order to ensure we are providing the best quality care, we have reviewed your chart and see that you are due for:  1. PAP smear    Please call the clinic at your earliest convenience to schedule an appointment or update us with the information we are missing.    Thank you for trusting us with your health care.        Sincerely,    Oliver Cheng MD  /ga

## 2018-11-22 ENCOUNTER — HOSPITAL ENCOUNTER (EMERGENCY)
Facility: CLINIC | Age: 28
Discharge: HOME | End: 2018-11-22
Attending: EMERGENCY MEDICINE | Admitting: EMERGENCY MEDICINE
Payer: COMMERCIAL

## 2018-11-22 VITALS
RESPIRATION RATE: 18 BRPM | BODY MASS INDEX: 43.4 KG/M2 | SYSTOLIC BLOOD PRESSURE: 130 MMHG | OXYGEN SATURATION: 98 % | WEIGHT: 245 LBS | TEMPERATURE: 97.7 F | DIASTOLIC BLOOD PRESSURE: 88 MMHG | HEART RATE: 54 BPM

## 2018-11-22 DIAGNOSIS — R10.13 ABDOMINAL PAIN, EPIGASTRIC: ICD-10-CM

## 2018-11-22 LAB
ALBUMIN SERPL-MCNC: 3.8 G/DL (ref 3.4–5)
ALP SERPL-CCNC: 60 U/L (ref 40–150)
ALT SERPL W P-5'-P-CCNC: 33 U/L (ref 0–50)
ANION GAP SERPL CALCULATED.3IONS-SCNC: 6 MMOL/L (ref 3–14)
AST SERPL W P-5'-P-CCNC: 24 U/L (ref 0–45)
BILIRUB SERPL-MCNC: 0.2 MG/DL (ref 0.2–1.3)
BUN SERPL-MCNC: 21 MG/DL (ref 7–30)
CALCIUM SERPL-MCNC: 9.1 MG/DL (ref 8.5–10.1)
CHLORIDE SERPL-SCNC: 107 MMOL/L (ref 94–109)
CO2 SERPL-SCNC: 26 MMOL/L (ref 20–32)
CREAT SERPL-MCNC: 0.72 MG/DL (ref 0.52–1.04)
GFR SERPL CREATININE-BSD FRML MDRD: >90 ML/MIN/1.7M2
GLUCOSE SERPL-MCNC: 82 MG/DL (ref 70–99)
HCG SERPL QL: NEGATIVE
HGB BLD-MCNC: 13.7 G/DL (ref 11.7–15.7)
LIPASE SERPL-CCNC: 153 U/L (ref 73–393)
POTASSIUM SERPL-SCNC: 4.8 MMOL/L (ref 3.4–5.3)
PROT SERPL-MCNC: 7.9 G/DL (ref 6.8–8.8)
SODIUM SERPL-SCNC: 139 MMOL/L (ref 133–144)

## 2018-11-22 PROCEDURE — 25000125 ZZHC RX 250: Performed by: EMERGENCY MEDICINE

## 2018-11-22 PROCEDURE — 99285 EMERGENCY DEPT VISIT HI MDM: CPT | Mod: 25

## 2018-11-22 PROCEDURE — 25000132 ZZH RX MED GY IP 250 OP 250 PS 637: Performed by: EMERGENCY MEDICINE

## 2018-11-22 PROCEDURE — 84703 CHORIONIC GONADOTROPIN ASSAY: CPT | Performed by: EMERGENCY MEDICINE

## 2018-11-22 PROCEDURE — 96376 TX/PRO/DX INJ SAME DRUG ADON: CPT

## 2018-11-22 PROCEDURE — 83690 ASSAY OF LIPASE: CPT | Performed by: EMERGENCY MEDICINE

## 2018-11-22 PROCEDURE — 85018 HEMOGLOBIN: CPT | Performed by: EMERGENCY MEDICINE

## 2018-11-22 PROCEDURE — 96375 TX/PRO/DX INJ NEW DRUG ADDON: CPT

## 2018-11-22 PROCEDURE — 25000128 H RX IP 250 OP 636: Performed by: EMERGENCY MEDICINE

## 2018-11-22 PROCEDURE — 96374 THER/PROPH/DIAG INJ IV PUSH: CPT

## 2018-11-22 PROCEDURE — 80053 COMPREHEN METABOLIC PANEL: CPT | Performed by: EMERGENCY MEDICINE

## 2018-11-22 RX ORDER — SUCRALFATE ORAL 1 G/10ML
1 SUSPENSION ORAL 4 TIMES DAILY
Qty: 420 ML | Refills: 1 | Status: SHIPPED | OUTPATIENT
Start: 2018-11-22

## 2018-11-22 RX ORDER — HYDROMORPHONE HYDROCHLORIDE 1 MG/ML
0.5 INJECTION, SOLUTION INTRAMUSCULAR; INTRAVENOUS; SUBCUTANEOUS ONCE
Status: COMPLETED | OUTPATIENT
Start: 2018-11-22 | End: 2018-11-22

## 2018-11-22 RX ORDER — ONDANSETRON 2 MG/ML
8 INJECTION INTRAMUSCULAR; INTRAVENOUS ONCE
Status: COMPLETED | OUTPATIENT
Start: 2018-11-22 | End: 2018-11-22

## 2018-11-22 RX ORDER — ONDANSETRON 4 MG/1
4 TABLET, ORALLY DISINTEGRATING ORAL EVERY 6 HOURS PRN
Qty: 10 TABLET | Refills: 0 | Status: SHIPPED | OUTPATIENT
Start: 2018-11-22 | End: 2018-11-25

## 2018-11-22 RX ORDER — KETOROLAC TROMETHAMINE 15 MG/ML
15 INJECTION, SOLUTION INTRAMUSCULAR; INTRAVENOUS ONCE
Status: COMPLETED | OUTPATIENT
Start: 2018-11-22 | End: 2018-11-22

## 2018-11-22 RX ORDER — TRAMADOL HYDROCHLORIDE 50 MG/1
50-100 TABLET ORAL EVERY 6 HOURS PRN
Qty: 12 TABLET | Refills: 0 | Status: SHIPPED | OUTPATIENT
Start: 2018-11-22

## 2018-11-22 RX ADMIN — LIDOCAINE HYDROCHLORIDE 30 ML: 20 SOLUTION ORAL; TOPICAL at 12:11

## 2018-11-22 RX ADMIN — Medication 0.5 MG: at 13:50

## 2018-11-22 RX ADMIN — ONDANSETRON 8 MG: 2 INJECTION INTRAMUSCULAR; INTRAVENOUS at 12:11

## 2018-11-22 RX ADMIN — KETOROLAC TROMETHAMINE 15 MG: 15 INJECTION, SOLUTION INTRAMUSCULAR; INTRAVENOUS at 13:29

## 2018-11-22 RX ADMIN — Medication 0.5 MG: at 12:44

## 2018-11-22 ASSESSMENT — ENCOUNTER SYMPTOMS
DYSURIA: 0
FREQUENCY: 0
ABDOMINAL PAIN: 1
NAUSEA: 1

## 2018-11-22 NOTE — LETTER
November 22, 2018      To Whom It May Concern:      Yu Machado was seen in our Emergency Department today, 11/22/18.  I expect her condition to improve over the next 1-3 days.  She may return to work/school when improved.    Sincerely,        Cherrie Carson RN

## 2018-11-22 NOTE — ED TRIAGE NOTES
28-year-old female presents to the ER with complaints of abd pain that started two days ago. No vomiting or diarrhea however states she has been a little nauseated.

## 2018-11-22 NOTE — ED AVS SNAPSHOT
[facility] Emergency Department    [address]    [facility] [address]    Phone:  [phone]    Fax:  [phone]                                       Yu Machado   MRN: [id number]    Department:  [facility] Emergency Department   Date of Visit:  11/22/2018           Patient Information     Date Of Birth          1990        Your diagnoses for this visit were:     Abdominal pain, epigastric        You were seen by Chema Bowling MD.      Follow-up Information     Follow up with Clinic, [facility]. Schedule an appointment as soon as possible for a visit in 4 days.    Specialty:  Internal Medicine    Contact information:    [address]  [facility] [id number] [phone]          Discharge Instructions       Discharge Instructions  Abdominal Pain    Abdominal pain (belly pain) can be caused by many things. Your evaluation today does not show the exact cause for your pain. Your provider today has decided that it is unlikely your pain is due to a life threatening problem, or a problem requiring surgery or hospital admission. Sometimes those problems cannot be found right away, so it is very important that you follow up as directed.  Sometimes only the changes which occur over time allow the cause of your pain to be found.    Generally, every Emergency Department visit should have a follow-up clinic visit with either a primary or a specialty clinic/provider. Please follow-up as instructed by your emergency provider today. With abdominal pain, we often recommend very close follow-up, such as the following day.    ADULTS:  Return to the Emergency Department right away if:      You get an oral temperature above 102oF or as directed by your provider.    You have blood in your stools. This may be bright red or appear as black, tarry stools.      You keep vomiting (throwing up) or cannot drink liquids.    You see blood when you vomit.     You  cannot have a bowel movement or you cannot pass gas.    Your stomach gets bloated or bigger.    Your skin or the whites of your eyes look yellow.    You faint.    You have bloody, frequent or painful urination (peeing).    You have new symptoms or anything that worries you.    CHILDREN:  Return to the Emergency Department right away if your child has any of the above-listed symptoms or the following:      Pushes your hand away or screams/cries when his/her belly is touched.    You notice your child is very fussy or weak.    Your child is very tired and is too tired to eat or drink.    Your child is dehydrated.  Signs of dehydration can be:  o Significant change in the amount of wet diapers/urine.  o Your infant or child starts to have dry mouth and lips, or no saliva (spit) or tears.    PREGNANT WOMEN:  Return to the Emergency Department right away if you have any of the above-listed symptoms or the following:      You have bleeding, leaking fluid or passing tissue from the vagina.    You have worse pain or cramping, or pain in your shoulder or back.    You have vomiting that will not stop.    You have a temperature of 100oF or more.    Your baby is not moving as much as usual.    You faint.    You get a bad headache with or without eye problems and abdominal pain.    You have a seizure.    You have unusual discharge from your vagina and abdominal pain.    Abdominal pain is pretty common during pregnancy.  Your pain may or may not be related to your pregnancy. You should follow-up closely with your OB provider so they can evaluate you and your baby.  Until you follow-up with your regular provider, do the following:       Avoid sex and do not put anything in your vagina.    Drink clear fluids.    Only take medications approved by your provider.    MORE INFORMATION:    Appendicitis:  A possible cause of abdominal pain in any person who still has their appendix is acute appendicitis. Appendicitis is often hard to  "diagnose.  Testing does not always rule out early appendicitis or other causes of abdominal pain. Close follow-up with your provider and re-evaluations may be needed to figure out the reason for your abdominal pain.    Follow-up:  It is very important that you make an appointment with your clinic and go to the appointment.  If you do not follow-up with your primary provider, it may result in missing an important development which could result in permanent injury or disability and/or lasting pain.  If there is any problem keeping your appointment, call your provider or return to the Emergency Department.    Medications:  Take your medications as directed by your provider today.  Before using over-the-counter medications, ask your provider and make sure to take the medications as directed.  If you have any questions about medications, ask your provider.    Diet:  Resume your normal diet as much as possible, but do not eat fried, fatty or spicy foods while you have pain.  Do not drink alcohol or have caffeine.  Do not smoke tobacco.    Probiotics: If you have been given an antibiotic, you may want to also take a probiotic pill or eat yogurt with live cultures. Probiotics have \"good bacteria\" to help your intestines stay healthy. Studies have shown that probiotics help prevent diarrhea (loose stools) and other intestine problems (including C. diff infection) when you take antibiotics. You can buy these without a prescription in the pharmacy section of the store.     If you were given a prescription for medicine here today, be sure to read all of the information (including the package insert) that comes with your prescription.  This will include important information about the medicine, its side effects, and any warnings that you need to know about.  The pharmacist who fills the prescription can provide more information and answer questions you may have about the medicine.  If you have questions or concerns that the " pharmacist cannot address, please call or return to the Emergency Department.       Remember that you can always come back to the Emergency Department if you are not able to see your regular provider in the amount of time listed above, if you get any new symptoms, or if there is anything that worries you.      24 Hour Appointment Hotline       To make an appointment at any [facility], call [phone] ([phone]). If you don't have a family doctor or clinic, we will help you find one. [facility] are conveniently located to serve the needs of you and your family.             Review of your medicines      START taking        Dose / Directions Last dose taken    ondansetron 4 MG ODT tab   Commonly known as:  ZOFRAN ODT   Dose:  4 mg   Quantity:  10 tablet        Take 1 tablet (4 mg) by mouth every 6 hours as needed for nausea   Refills:  0        ranitidine 150 MG tablet   Commonly known as:  ZANTAC   Dose:  150 mg   Quantity:  30 tablet        Take 1 tablet (150 mg) by mouth 2 times daily for 15 days   Refills:  0        sucralfate 1 GM/10ML suspension   Commonly known as:  CARAFATE   Dose:  1 g   Quantity:  420 mL        Take 10 mLs (1 g) by mouth 4 times daily   Refills:  1        traMADol 50 MG tablet   Commonly known as:  ULTRAM   Dose:   mg   Quantity:  12 tablet        Take 1-2 tablets ( mg) by mouth every 6 hours as needed for severe pain   Refills:  0          Our records show that you are taking the medicines listed below. If these are incorrect, please call your family doctor or clinic.        Dose / Directions Last dose taken    hydrOXYzine 10 MG tablet   Commonly known as:  ATARAX   Quantity:  120 tablet        Take 1-2 tablets (10-20mg) up to 3 times per day as needed for anxity   Refills:  0        meclizine 25 MG tablet   Commonly known as:  ANTIVERT   Dose:  25 mg   Quantity:  30 tablet        Take 1 tablet (25 mg) by mouth every 6 hours as needed for dizziness   Refills:   1        mirtazapine 15 MG tablet   Commonly known as:  REMERON   Dose:  15 mg   Quantity:  30 tablet        Take 1 tablet (15 mg) by mouth At Bedtime   Refills:  0        propranolol 10 MG tablet   Commonly known as:  INDERAL   Dose:  10 mg   Quantity:  90 tablet        Take 1 tablet (10 mg) by mouth 3 times daily As needed for anxiety   Refills:  0                Information about OPIOIDS     PRESCRIPTION OPIOIDS: WHAT YOU NEED TO KNOW   We gave you an opioid (narcotic) pain medicine. It is important to manage your pain, but opioids are not always the best choice. You should first try all the other options your care team gave you. Take this medicine for as short a time (and as few doses) as possible.    Some activities can increase your pain, such as bandage changes or therapy sessions. It may help to take your pain medicine 30 to 60 minutes before these activities. Reduce your stress by getting enough sleep, working on hobbies you enjoy and practicing relaxation or meditation. Talk to your care team about ways to manage your pain beyond prescription opioids.    These medicines have risks:    DO NOT drive when on new or higher doses of pain medicine. These medicines can affect your alertness and reaction times, and you could be arrested for driving under the influence (DUI). If you need to use opioids long-term, talk to your care team about driving.    DO NOT operate heavy machinery    DO NOT do any other dangerous activities while taking these medicines.    DO NOT drink any alcohol while taking these medicines.     If the opioid prescribed includes acetaminophen, DO NOT take with any other medicines that contain acetaminophen. Read all labels carefully. Look for the word  acetaminophen  or  Tylenol.  Ask your pharmacist if you have questions or are unsure.    You can get addicted to pain medicines, especially if you have a history of addiction (chemical, alcohol or substance dependence). Talk to your care team  about ways to reduce this risk.    All opioids tend to cause constipation. Drink plenty of water and eat foods that have a lot of fiber, such as fruits, vegetables, prune juice, apple juice and high-fiber cereal. Take a laxative (Miralax, milk of magnesia, Colace, Senna) if you don t move your bowels at least every other day. Other side effects include upset stomach, sleepiness, dizziness, throwing up, tolerance (needing more of the medicine to have the same effect), physical dependence and slowed breathing.    Store your pills in a secure place, locked if possible. We will not replace any lost or stolen medicine. If you don t finish your medicine, please throw away (dispose) as directed by your pharmacist. The [place] Pollution Control Agency has more information about safe disposal: https://www.pca.[facility].[place]/living-green/managing-unwanted-medications        Prescriptions were sent or printed at these locations (4 Prescriptions)                   Other Prescriptions                Printed at Department/Unit printer (4 of 4)         ondansetron (ZOFRAN ODT) 4 MG ODT tab               ranitidine (ZANTAC) 150 MG tablet               sucralfate (CARAFATE) 1 GM/10ML suspension               traMADol (ULTRAM) 50 MG tablet                Procedures and tests performed during your visit     Comprehensive metabolic panel    HCG QUALitative pregnancy (blood)    Hemoglobin    Lipase    Peripheral IV catheter      Orders Needing Specimen Collection     None      Pending Results     No orders found from 11/20/2018 to 11/23/2018.            Pending Culture Results     No orders found from 11/20/2018 to 11/23/2018.            Pending Results Instructions     If you had any lab results that were not finalized at the time of your Discharge, you can call the ED Lab Result RN at [phone]. You will be contacted by this team for any positive Lab results or changes in treatment. The nurses are available 7 days a week from 10A to  6:30P.  You can leave a message 24 hours per day and they will return your call.        Test Results From Your Hospital Stay        11/22/2018 12:14 PM      Component Results     Component Value Ref Range & Units Status    Hemoglobin 13.7 11.7 - 15.7 g/dL Final         11/22/2018 12:44 PM      Component Results     Component Value Ref Range & Units Status    HCG Qualitative Serum Negative NEG^Negative Final    This test is for screening purposes.  Results should be interpreted along with   the clinical picture.  Confirmation testing is available if warranted by   ordering NQP415, HCG Quantitative Pregnancy.           11/22/2018 12:31 PM      Component Results     Component Value Ref Range & Units Status    Sodium 139 133 - 144 mmol/L Final    Potassium 4.8 3.4 - 5.3 mmol/L Final    Chloride 107 94 - 109 mmol/L Final    Carbon Dioxide 26 20 - 32 mmol/L Final    Anion Gap 6 3 - 14 mmol/L Final    Glucose 82 70 - 99 mg/dL Final    Urea Nitrogen 21 7 - 30 mg/dL Final    Creatinine 0.72 0.52 - 1.04 mg/dL Final    GFR Estimate >90 >60 mL/min/1.7m2 Final    Non  GFR Calc    GFR Estimate If Black >90 >60 mL/min/1.7m2 Final    African American GFR Calc    Calcium 9.1 8.5 - 10.1 mg/dL Final    Bilirubin Total 0.2 0.2 - 1.3 mg/dL Final    Albumin 3.8 3.4 - 5.0 g/dL Final    Protein Total 7.9 6.8 - 8.8 g/dL Final    Alkaline Phosphatase 60 40 - 150 U/L Final    ALT 33 0 - 50 U/L Final    AST 24 0 - 45 U/L Final         11/22/2018 12:31 PM      Component Results     Component Value Ref Range & Units Status    Lipase 153 73 - 393 U/L Final                Clinical Quality Measure: Blood Pressure Screening     Your blood pressure was checked while you were in the emergency department today. The last reading we obtained was  BP: 130/88 . Please read the guidelines below about what these numbers mean and what you should do about them.  If your systolic blood pressure (the top number) is less than 120 and your  "diastolic blood pressure (the bottom number) is less than 80, then your blood pressure is normal. There is nothing more that you need to do about it.  If your systolic blood pressure (the top number) is 120-139 or your diastolic blood pressure (the bottom number) is 80-89, your blood pressure may be higher than it should be. You should have your blood pressure rechecked within a year by a primary care provider.  If your systolic blood pressure (the top number) is 140 or greater or your diastolic blood pressure (the bottom number) is 90 or greater, you may have high blood pressure. High blood pressure is treatable, but if left untreated over time it can put you at risk for heart attack, stroke, or kidney failure. You should have your blood pressure rechecked by a primary care provider within the next 4 weeks.  If your provider in the emergency department today gave you specific instructions to follow-up with your doctor or provider even sooner than that, you should follow that instruction and not wait for up to 4 weeks for your follow-up visit.        Thank you for choosing [facility]       Thank you for choosing [facility] for your care. Our goal is always to provide you with excellent care. Hearing back from our patients is one way we can continue to improve our services. Please take a few minutes to complete the written survey that you may receive in the mail after you visit with us. Thank you!        [facility] Information     [employer] lets you send messages to your doctor, view your test results, renew your prescriptions, schedule appointments and more. To sign up, go to [email or web address] . Click on \"Log in\" on the left side of the screen, which will take you to the Welcome page. Then click on \"Sign up Now\" on the right side of the page.     You will be asked to enter the access code listed below, as well as some personal information. Please follow the directions to create your username and password.     Your " access code is: [email or web address]  Expires: 2019  2:04 PM     Your access code will  in 90 days. If you need help or a new code, please call your [facility] clinic or [phone].        Care EveryWhere ID     This is your Care EveryWhere ID. This could be used by other organizations to access your [facility] medical records  [id number]        Equal Access to Services     [facility] : Hadii ezequiel castle hadasho Soomaali, waaxda luqadaha, qaybta kaalmada adeegyada, waxay shinin vic jaimes . So [facility] [phone].    ATENCIÓN: Si habla murphyañol, tiene a mckeon disposición servicios gratuitos de asistencia lingüística. Llame al [phone].    We comply with applicable federal civil rights laws and [place] laws. We do not discriminate on the basis of race, color, national origin, age, disability, sex, sexual orientation, or gender identity.            After Visit Summary       This is your record. Keep this with you and show to your community pharmacist(s) and doctor(s) at your next visit.

## 2018-11-22 NOTE — ED PROVIDER NOTES
History     Chief Complaint:  Abdominal Pain    HPI   Yu Machado is a 28 year old female with a history of chronic low back pain who presents with abdominal pain. The patient reports 2 nights ago she developed intermittent epigastric abdominal pain that seemed like it was improving yesterday, but then started getting worse today. She states it radiates to right side and is worse with movement. There are no alleviating factors to her pain. She also complains of having nausea. She notes she has also had some vaginal discharge and spotting, but states she gets her period irregularly and this is typical for her. She denies having any problems passing stool, dysuria or frequency. Of note, she does take ibuprofen often and normally takes 600 mg 1-2 times per day, 3-4 days per week. She also reports she had a cholecystectomy 6 years ago.    Allergies:  No known drug allergies    Medications:    Atarax  Meclizine  Remeron  Inderal    Past Medical History:    Chronic low back pain  Morbid obesity  Anxiety  Sleep disorder  Herniation of intervertebral disc between L5 and S1  Fibrocystic changes of right breast    Past Surgical History:    Cholecystectomy  Lumbar back surgery    Family History:    Hypertension    Social History:  The patient presents to the ED with her significant other and child.  Marital status: Single  Smoking status: Current Every Day Smoker  Alcohol use: Yes    Review of Systems   Gastrointestinal: Positive for abdominal pain (epigastric) and nausea.        No difficulty passing stool   Genitourinary: Positive for vaginal bleeding and vaginal discharge. Negative for dysuria and frequency.   All other systems reviewed and are negative.    Physical Exam     Patient Vitals for the past 24 hrs:   BP Temp Temp src Pulse Resp SpO2 Weight   11/22/18 1145 130/88 97.7  F (36.5  C) Oral 54 18 98 % 111.1 kg (245 lb)       Physical Exam    Constitutional:  Pleasant, age appropriate female who appears in  discomfort  HEENT:    Oropharynx is moist  Eyes:    Conjunctiva normal  Neck:    Supple, no meningismus.     CV:     Regular rate and rhythm.      No murmurs, rubs or gallops.  PULM:    Clear to auscultation bilateral.       No respiratory distress.      Good air exchange.  ABD:    Soft, non-distended.       Moderate focal tenderness in the epigastrium.     Bowel sounds normal.     No pulsatile masses.       No rebound, guarding or rigidity.     No CVA tenderness.   MSK:     No gross deformity to all four extremities.   LYMPH:   No cervical lymphadenopathy.  NEURO:   Alert.  Good muscular tone, no atrophy.   Skin:    Warm, dry and intact.    Psych:    Mood is good and affect is appropriate.      Emergency Department Course     Laboratory:  CMP: WNL (Creatinine 0.72)  HCG Qualitative Pregnancy (Blood): Negative  Hemoglobin: 13.7  Lipase: 153    Interventions:  1211: GI Cocktail - Maalox 15 mL, Viscous Lidocaine 15 mL, 30 mL suspension PO  1211: Zofran 8 mg IV  1244: Dilaudid 0.5 mg IV  1329: Toradol 15 mg IV  1350: Dilaudid 0.5 mg IV    Emergency Department Course:  Past medical records, nursing notes, and vitals reviewed.  1152: I performed an exam of the patient and obtained history, as documented above.   IV inserted and blood samples were collected and sent for laboratory testing, findings above.    1315: I rechecked the patient and explained the findings.    Findings and plan explained to the patient. Patient discharged home with instructions regarding supportive care, medications, and reasons to return. The importance of close follow-up was reviewed.    Impression & Plan      Medical Decision Makin-year-old female with history of prior cholecystectomy who presents the ED with epigastric pain.  Laboratory studies reveal no evidence of hepatitis, pancreatitis or abnormal liver function tests to draw concern for retained intraductal stone.  She did not have significant improvement with GI cocktail.  The  remainder of her abdominal examination is benign with isolated focal tenderness in the epigastric region.  Primary differential diagnosis would include peptic ulcer disease, gastritis, GERD, IBS, IBD, dietary intolerance.  Patient will be discharged home with a limited supply of tramadol along with Zantac and sucralfate.  Patient to follow-up with primary care physician and return the ED for any worsening symptoms.    Diagnosis:    ICD-10-CM    1. Abdominal pain, epigastric R10.13        Disposition:  discharged to home    Discharge Medications:  New Prescriptions    ONDANSETRON (ZOFRAN ODT) 4 MG ODT TAB    Take 1 tablet (4 mg) by mouth every 6 hours as needed for nausea    RANITIDINE (ZANTAC) 150 MG TABLET    Take 1 tablet (150 mg) by mouth 2 times daily for 15 days    SUCRALFATE (CARAFATE) 1 GM/10ML SUSPENSION    Take 10 mLs (1 g) by mouth 4 times daily    TRAMADOL (ULTRAM) 50 MG TABLET    Take 1-2 tablets ( mg) by mouth every 6 hours as needed for severe pain         Savi Drummond  11/22/2018   [facility] EMERGENCY DEPARTMENT  I, Savi Drummond, am serving as a scribe at 11:52 AM on 11/22/2018 to document services personally performed by Chema Bowling MD based on my observations and the provider's statements to me.        Chema Bowling MD  11/22/18 2427

## 2018-11-22 NOTE — ED AVS SNAPSHOT
[facility] Emergency Department    [address]    [facility] [address]    Phone:  [phone]    Fax:  [phone]                                       Yu Machado   MRN: [id number]    Department:  [facility] Emergency Department   Date of Visit:  11/22/2018           After Visit Summary Signature Page     I have received my discharge instructions, and my questions have been answered. I have discussed any challenges I see with this plan with the nurse or doctor.    ..........................................................................................................................................  Patient/Patient Representative Signature      ..........................................................................................................................................  Patient Representative Print Name and Relationship to Patient    ..................................................               ................................................  Date                                   Time    ..........................................................................................................................................  Reviewed by Signature/Title    ...................................................              ..............................................  Date                                               Time          22EPIC Rev 08/18

## 2019-01-03 ENCOUNTER — APPOINTMENT (OUTPATIENT)
Dept: CT IMAGING | Facility: CLINIC | Age: 29
End: 2019-01-03
Payer: OTHER MISCELLANEOUS

## 2019-01-03 ENCOUNTER — HOSPITAL ENCOUNTER (EMERGENCY)
Facility: CLINIC | Age: 29
Discharge: HOME | End: 2019-01-03
Attending: NURSE PRACTITIONER | Admitting: NURSE PRACTITIONER
Payer: OTHER MISCELLANEOUS

## 2019-01-03 VITALS
HEART RATE: 70 BPM | BODY MASS INDEX: 41.81 KG/M2 | RESPIRATION RATE: 18 BRPM | DIASTOLIC BLOOD PRESSURE: 79 MMHG | TEMPERATURE: 98 F | SYSTOLIC BLOOD PRESSURE: 150 MMHG | WEIGHT: 236 LBS | OXYGEN SATURATION: 97 %

## 2019-01-03 DIAGNOSIS — S06.0X0D CONCUSSION WITHOUT LOSS OF CONSCIOUSNESS, SUBSEQUENT ENCOUNTER: ICD-10-CM

## 2019-01-03 DIAGNOSIS — S09.90XA HEAD INJURY, INITIAL ENCOUNTER: ICD-10-CM

## 2019-01-03 LAB — B-HCG FREE SERPL-ACNC: <5 IU/L

## 2019-01-03 PROCEDURE — 70450 CT HEAD/BRAIN W/O DYE: CPT

## 2019-01-03 PROCEDURE — 84702 CHORIONIC GONADOTROPIN TEST: CPT

## 2019-01-03 PROCEDURE — 99284 EMERGENCY DEPT VISIT MOD MDM: CPT | Mod: 25

## 2019-01-03 RX ORDER — ONDANSETRON 4 MG/1
4 TABLET, ORALLY DISINTEGRATING ORAL ONCE
Status: DISCONTINUED | OUTPATIENT
Start: 2019-01-03 | End: 2019-01-03 | Stop reason: HOSPADM

## 2019-01-03 ASSESSMENT — ENCOUNTER SYMPTOMS
WEAKNESS: 0
PHOTOPHOBIA: 1
VOMITING: 1
EYE PAIN: 1
HEADACHES: 1
DECREASED CONCENTRATION: 1

## 2019-01-03 NOTE — ED PROVIDER NOTES
"  History     Chief Complaint:  Head Injury      HPI   Yu Machado is a 28 year old female with a history of chronic low back pain and anxiety who presents to the ED for evaluation of a head injury. 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 She reports losing consciousness for an unspecified amount of time. The patient states that she initially went home but presented to urgent care the following day due to a worsening headache and disorientation. She obtained a negative head CT at that time and was discharged home with Robaxin and diagnosed with a a concussion.     Since then, the patient states that she has been experiencing daily headaches fluctuating in intensity. These are associated with intermittent visual changes and balance issues, which her  notes are exacerbated by stress, light, and loud noises. The patient describes her headaches as localized to the back of her head with occasional radiation to the front of her head. This afternoon around 1450, the patient states that she was at her mother's house when she slipped down the stairs, striking her head and low back. She denies any loss of consciousness. The patient soon developed a \"tingling, sharp pain\" to the top of her head and experienced two episodes of emesis, prompting her return to the ED. Here, the patient endorses a continued headache and eye pain.  She has a history of back pain and is concerned she may be in pain tomorrow but denies any current back pain. The patient denies any midline neck pain as well.  She additionally notes that she has been on a leave of absence from work since her initial fall.  She states she contacted her PCP today regarding the fall and was referred to ED for evaluation.  She states she has vestibular therapy arranged to help with concussion " symptoms.      Allergies:  NKDA    Medications:    Atarax  Ultram  Antivert  Remeron  Inderal  Carafate  Robaxin    Past Medical History:    Chronic low back pain  Obesity  Herniation of intervertebral disc  Sleep disorder  Anxiety  Fibrocystic changes of right breast    Past Surgical History:    Cholecystectomy  Lumbar surgery    Family History:    Hypertension     Social History:  Current every day smoker   Uses alcohol.   Marital Status:  Single [1]     Review of Systems   Eyes: Positive for photophobia and pain.   Gastrointestinal: Positive for vomiting.   Musculoskeletal: Positive for gait problem.   Neurological: Positive for headaches. Negative for weakness.   Psychiatric/Behavioral: Positive for decreased concentration.   All other systems reviewed and are negative.      Physical Exam     Patient Vitals for the past 24 hrs:   BP Temp Pulse Heart Rate Resp SpO2 Weight   01/03/19 1632 150/79 98  F (36.7  C) 70 70 18 97 % 107 kg (236 lb)        Physical Exam  General: Patient is alert and interactive   Head:  The scalp, face, and head appear normal. Atraumatic.   Eyes:  The pupils are equal, round, and reactive to light    Conjunctivae and sclerae are normal  ENT:    No hemotympanum or signs basilar skull fracture    The oropharynx is normal without erythema.     Uvula is in the midline.   Neck:  Normal range of motion  CV:  Regular rate. S1/S2. No murmurs.   Resp:  Lungs are clear without wheezes or rales. No distress. No crepitance.   GI:  Abdomen is soft, no rigidity, guarding, or rebound. No contusion.    No distension. No tenderness to palpation in any quadrant.     MS:  Normal tone. Joints grossly normal without effusions.     No asymmetric leg swelling, calf or thigh tenderness.      Normal motor assessment of all extremities.    No C/T/L tenderness in midline or laterally, spines cleared   Skin:  No rash or lesions noted. Normal capillary refill noted  Neuro: Alert and oriented to person/place/time.   Neck supple. no aphasia/facial    droop/dysarthria, tongue midline, symmetric palatal elevation, normal    strength at SCM/trapezius/BUE/BLE, normal coordination to FNF at BUE    And heel-to-shin at BLE, normal casual gait,no pronator drift,     sensation intact to LT over face/BUE/BLE  Psych:  Awake. Alert.  Normal affect.  Appropriate interactions.        Emergency Department Course   Imaging:  Radiographic findings were communicated with the patient who voiced understanding of the findings.  CT Head without contrast:   No bleed or fracture, as per radiology.     Laboratory:  ISTAT hCG: <0.5    Emergency Department Course:  Nursing notes and vitals reviewed. (1729) I performed an exam of the patient as documented above.     The patient was sent for a head CT while in the emergency department, findings above.     (1821) Radiology called to ask for a point of care pregnancy test prior to imaging. This was obtained, findings above.    (1906) I rechecked the patient and discussed the results of her workup thus far.     Findings and plan explained to the Patient. Patient discharged home with instructions regarding supportive care, medications, and reasons to return. The importance of close follow-up was reviewed.     Impression & Plan    Medical Decision Making:  Yu Machado is a 28 year old female who presents for evaluation after fall with trauma to the head in setting of recent concussion.  Here she is overall well-appearing with a nonfocal neurologic examination.  Differential includes contusion, concussion, skull fracture, epidural hematoma, subdural hematoma, intracerebral hemorrhage, and traumatic subarachnoid hemorrhage.  After risk-benefit discussion with patient, she opts to proceed with a head CT today.  Fortunately,head CT is negative for any acute finding.  Risk of delayed bleed is low but patient informed of this possibility.  Her head to toe exam is otherwise negative for any acute trauma.  She does  have good follow-up with PCP arranged as well as vestibular therapy to treat ongoing concussion.  At this time she is appropriate for outpatient management.  Return to ED with red flag symptoms including change in behavior, severe headaches, drowsiness, seizures, repeated vomiting, one-sided weakness or any further concerns.  All questions answered prior to discharge.    Diagnosis:    ICD-10-CM    1. Head injury, initial encounter S09.90XA    2. Concussion without loss of consciousness, subsequent encounter S06.0X0D        Disposition:  discharged to home    Discharge Medications:     Medication List      There are no discharge medications for this visit.         Scribe Disclosure:  I, Chary Durbin, am serving as a scribe on 1/3/2019 at 7:05 PM to personally document services performed by Khushbu Menard APRN * based on my observations and the provider's statements to me.      Chary Durbin  1/3/2019   [facility] EMERGENCY DEPARTMENT       Khushbu Menard APRN CNP  01/03/19 1923

## 2019-01-03 NOTE — ED TRIAGE NOTES
Patient presents with complaints of headaches, balance issues, and vision changes which have been ongoing for a few weeks now, ever since patient fell and hit her head. Patient had imaging at that time. Patient fell on the stairs and hit er head again today. Patient not on any blood thinning medication.  ABC intact without need for intervention at this time.

## 2019-01-03 NOTE — ED AVS SNAPSHOT
[facility] Emergency Department  [address]  [facility] [address]  Phone:  [phone]  Fax:  [phone]                                    Yu Machado   MRN: [id number]    Department:  [facility] Emergency Department   Date of Visit:  1/3/2019           After Visit Summary Signature Page    I have received my discharge instructions, and my questions have been answered. I have discussed any challenges I see with this plan with the nurse or doctor.    ..........................................................................................................................................  Patient/Patient Representative Signature      ..........................................................................................................................................  Patient Representative Print Name and Relationship to Patient    ..................................................               ................................................  Date                                   Time    ..........................................................................................................................................  Reviewed by Signature/Title    ...................................................              ..............................................  Date                                               Time          22EPIC Rev 08/18

## 2019-02-01 ENCOUNTER — HOSPITAL ENCOUNTER (OUTPATIENT)
Dept: PHYSICAL THERAPY | Facility: CLINIC | Age: 29
End: 2019-02-01
Payer: OTHER MISCELLANEOUS

## 2019-02-01 DIAGNOSIS — R51.9 NONINTRACTABLE EPISODIC HEADACHE, UNSPECIFIED HEADACHE TYPE: ICD-10-CM

## 2019-02-01 DIAGNOSIS — S06.0X1D CONCUSSION WITH LOSS OF CONSCIOUSNESS OF 30 MINUTES OR LESS, SUBSEQUENT ENCOUNTER: ICD-10-CM

## 2019-02-01 DIAGNOSIS — R42 DIZZINESS: Primary | ICD-10-CM

## 2019-02-01 DIAGNOSIS — M54.2 NECK PAIN: ICD-10-CM

## 2019-02-01 DIAGNOSIS — R26.89 IMPAIRMENT OF BALANCE: ICD-10-CM

## 2019-02-01 PROCEDURE — 97112 NEUROMUSCULAR REEDUCATION: CPT | Mod: GP | Performed by: PHYSICAL THERAPIST

## 2019-02-01 PROCEDURE — 97162 PT EVAL MOD COMPLEX 30 MIN: CPT | Mod: GP | Performed by: PHYSICAL THERAPIST

## 2019-02-02 NOTE — PROGRESS NOTES
02/01/19 1445   Quick Adds   Quick Adds Vestibular Eval   Type of Visit Initial OP PT Evaluation   General Information   Start of Care Date 02/01/19   Referring Physician Juan Carlos Cole PA-C   Orders Evaluate and Treat as Indicated   Order Date 01/02/19   Medical Diagnosis concussion with loss of consciousness of 30 minutes or less   Onset of illness/injury or Date of Surgery 12/19/18   Surgical/Medical history reviewed Yes   Pertinent history of current problem (include personal factors and/or comorbidities that impact the POC) Patient arriving today with complaints of neck pain, fatigue, dizziness, headaches, light sensitivity, fogginess, blurry vision, poor balance, and difficulty sleeping following a work injury on 12/19/2018 when the patient slipped on a wet surface at work and hit the left side of the back of her head.  Patient reporting loss of consciousness and not being able to recall anything after hitting her head.  Patient reporting that for the first few weeks her symptoms were bad (had headache for 3 weeks straight), however, she beginning to feel better until she attempted to return to work (patient reporting an increase in headaches and felt anxious and panic).  Patient had second fall resulting in hitting her head in the same place on 1/3/2019, was walking down steps at her mother's house and felt dizzy resulting in a loss of balance; patient reporting no significant changes in her symptoms following this incident.  Patient reporting she experiences the dizziness mainly in standing when turning her head towards the left or when looking down and then up; denies dizziness with positional changes.  Patient reporting blurry vision (improved since initial injury) especially when headaches are bad and that she will occasionally get a black spot in her right eye.  Patient also reporting difficulty focusing on objects with her eyes.  Patient reporting a few days ago she got really dizzy and tired and  "slowly lowered herself to the floor and slept for a while; patient reporting that she getting over being sick.  Patient reports an increase in headaches when overly stimulated (busy environment) and performing > moderate physical activity.  PMH: history of chronic low back pain (lumbar surgery), sleep disorder and anxiety; current every day smoker.   Patient also reporting a history of occasional migraines and headaches prior to her menstrual cycle or after consuming a lot of caffeine and then not having it for a while.   Pertinent Visual History  glasses for distance - prescription is a year old   Prior level of function comment Active and independent with no issues   Current Community Support Family/friend caregiver  ( fiale )   Patient role/Employment history Employed  (currently off work due to symptoms; physical job at [employer])   Living environment Apartment/condo  (has elevator)   Home/Community Accessibility Comments Currently not driving   Assistive Devices Comments No current assistive devices or ADL devices   Patient/Family Goals Statement decrease headaches and improve balance   Fall Risk Screen   Fall screen completed by PT   Have you fallen 2 or more times in the past year? Yes   Have you fallen and had an injury in the past year? Yes   Is patient a fall risk? Yes   Fall screen comments Patient at risk for falls given current concussion symptom presentation.   Functional Scales   Functional Scales and Outcomes Concussion Symptom Assessment Score: 42   Pain   Pain comments Patient reporting headaches that come in waves; experiences right frontal headache that are \"throbbing and sharp\" and left occipital referring into parietal headaches, \"pressure and dull with little tingles and sharp pain here and there.\"   Cognitive Status Examination   Orientation orientation to person, place and time   Level of Consciousness alert   Follows Commands and Answers Questions 100% of the time   Personal Safety and " "Judgment intact   Observation   Observation Sensitive to light, had to lower blinds in room and dim lights in clinic space.  Fiancé present during treatment session.   Posture   Posture Forward head position;Protracted shoulders   Range of Motion (ROM)   ROM Comment Bilateral upper and lower extremities WFL for tasks performed.   Strength   Strength Comments Bilateral upper and lower extremities WFL for tasks performed.   Transfer Skills   Transfer Comments Independent   Gait   Gait Comments Patient able to ambulate independently around clinic space and demonstrated postural instability and difficulty with balance when asked to perform a dynamic challenge.   Gait Special Tests 25 Foot Timed Walk   Comments Normal pace: 7.42 seconds; forward with horizontal head turns: 13.34 seconds with close SBA to min assist of one due to postural instability and impaired dynamic balance.   Balance   Balance Comments Patient demonstrating postural instability and deficits with balance, dynamic > static, as observed during assessment.   Muscle Tone   Muscle Tone no deficits were identified   Cervicogenic Screen   Neck ROM Full active range of motion with the exception of limited end range left cervical rotation and extension with patient reporting \"muscle tightness/cramping.\"   Oculomotor Exam   Smooth Pursuit Abnormal   Smooth Pursuit Comment Patient demonstrating difficulty with tracking, especially when returning to midline, and reporting \"blurry vision.\"  Patient also reporting onset/increase of right frontal headache.   Saccades Abnormal   Saccades Comments Slow and difficult to perform with patient reporting increase of right frontal headache.   VOR Abnormal   VOR Comments Slow and difficult to perform with patient reporting increase of right frontal headache.   VOR Cancellation Abnormal   VOR Cancellation Comments slight increase in symptoms - headache, blurry   Convergence Testing Abnormal   Convergence Testing Comments ~ " 10 inches - blurrly and double vision (horizontal) with a increase in headache symptoms   Planned Therapy Interventions   Planned Therapy Interventions neuromuscular re-education;ROM;stretching;manual therapy;other (see comments)  (vestibular rehab)   Clinical Impression   Criteria for Skilled Therapeutic Interventions Met yes, treatment indicated   PT Diagnosis Decreased safety with functional mobility   Influenced by the following impairments Concussion symptoms - light sensitivity, headaches, vestibular deficits (possible hypofunction?), resolving neck pain; postural instability, dynamic > static balance   Functional limitations due to impairments Decreased safety with functional mobility, ADLs, and IADLs.   Clinical Presentation Evolving/Changing   Clinical Presentation Rationale Based on current presentation, PMH, and social support.   Clinical Decision Making (Complexity) Moderate complexity   Therapy Frequency 1 time/week   Predicted Duration of Therapy Intervention (days/wks) 8 weeks   Risk & Benefits of therapy have been explained Yes   Patient, Family & other staff in agreement with plan of care Yes   Education Assessment   Preferred Learning Style Listening;Demonstration   Barriers to Learning Emotional  (hx of anxiety)   Goal 1   Goal Identifier 25' walk   Goal Description The patient will demonstrate consistent times with performance of normal 25' walk, 25' walk with HT, and 25' walk with cognitive challenge to demonstrate improved motor function with dual tasking and divided attention tasks to facilitate return to baseline level of function and engagement in motor task with added complexities.   Target Date 04/01/19   Goal 2   Goal Identifier DVA   Goal Description The patient will score within 2 lines of SVA with performance of DVA to demonstrate that her vestibular system is functioning optimally and is able to support gaze stability within a functional range.   Target Date 04/01/19   Goal 3   Goal  Identifier CSA    Goal Description The patient will score 10/90 or less on CSA to demonstrate a significant improvement in concussion symptom severity and utilization of management techniques.   Target Date 04/01/19   Total Evaluation Time   PT Kirsten, Moderate Complexity Minutes ([id number]) 40

## 2019-02-04 NOTE — ADDENDUM NOTE
Encounter addended by: Africa Renteria, PT on: 2/4/2019 9:05 AM   Actions taken: Flowsheet accepted, Visit diagnoses modified

## 2019-02-15 ENCOUNTER — APPOINTMENT (OUTPATIENT)
Dept: CARDIOLOGY | Facility: CLINIC | Age: 29
End: 2019-02-15
Attending: EMERGENCY MEDICINE
Payer: COMMERCIAL

## 2019-02-15 ENCOUNTER — HOSPITAL ENCOUNTER (EMERGENCY)
Facility: CLINIC | Age: 29
Discharge: HOME | End: 2019-02-15
Attending: EMERGENCY MEDICINE | Admitting: EMERGENCY MEDICINE
Payer: COMMERCIAL

## 2019-02-15 VITALS
DIASTOLIC BLOOD PRESSURE: 77 MMHG | HEART RATE: 72 BPM | SYSTOLIC BLOOD PRESSURE: 107 MMHG | TEMPERATURE: 97.4 F | BODY MASS INDEX: 44.29 KG/M2 | RESPIRATION RATE: 22 BRPM | OXYGEN SATURATION: 98 % | WEIGHT: 250 LBS

## 2019-02-15 DIAGNOSIS — R55 SYNCOPE AND COLLAPSE: ICD-10-CM

## 2019-02-15 LAB
ANION GAP SERPL CALCULATED.3IONS-SCNC: 4 MMOL/L (ref 3–14)
B-HCG FREE SERPL-ACNC: <5 IU/L
BASOPHILS # BLD AUTO: 0 10E9/L (ref 0–0.2)
BASOPHILS NFR BLD AUTO: 0.6 %
BUN SERPL-MCNC: 15 MG/DL (ref 7–30)
CALCIUM SERPL-MCNC: 8.7 MG/DL (ref 8.5–10.1)
CHLORIDE SERPL-SCNC: 109 MMOL/L (ref 94–109)
CO2 SERPL-SCNC: 27 MMOL/L (ref 20–32)
CREAT SERPL-MCNC: 0.7 MG/DL (ref 0.52–1.04)
DIFFERENTIAL METHOD BLD: NORMAL
EOSINOPHIL # BLD AUTO: 0.1 10E9/L (ref 0–0.7)
EOSINOPHIL NFR BLD AUTO: 1.7 %
ERYTHROCYTE [DISTWIDTH] IN BLOOD BY AUTOMATED COUNT: 12.9 % (ref 10–15)
GFR SERPL CREATININE-BSD FRML MDRD: >90 ML/MIN/{1.73_M2}
GLUCOSE SERPL-MCNC: 88 MG/DL (ref 70–99)
HCT VFR BLD AUTO: 41.5 % (ref 35–47)
HGB BLD-MCNC: 13.3 G/DL (ref 11.7–15.7)
IMM GRANULOCYTES # BLD: 0 10E9/L (ref 0–0.4)
IMM GRANULOCYTES NFR BLD: 0.3 %
INTERPRETATION ECG - MUSE: NORMAL
LYMPHOCYTES # BLD AUTO: 2 10E9/L (ref 0.8–5.3)
LYMPHOCYTES NFR BLD AUTO: 29 %
MAGNESIUM SERPL-MCNC: 2.2 MG/DL (ref 1.6–2.3)
MCH RBC QN AUTO: 28.8 PG (ref 26.5–33)
MCHC RBC AUTO-ENTMCNC: 32 G/DL (ref 31.5–36.5)
MCV RBC AUTO: 90 FL (ref 78–100)
MONOCYTES # BLD AUTO: 0.5 10E9/L (ref 0–1.3)
MONOCYTES NFR BLD AUTO: 7 %
NEUTROPHILS # BLD AUTO: 4.3 10E9/L (ref 1.6–8.3)
NEUTROPHILS NFR BLD AUTO: 61.4 %
NRBC # BLD AUTO: 0 10*3/UL
NRBC BLD AUTO-RTO: 0 /100
PLATELET # BLD AUTO: 241 10E9/L (ref 150–450)
POTASSIUM SERPL-SCNC: 4.1 MMOL/L (ref 3.4–5.3)
RBC # BLD AUTO: 4.62 10E12/L (ref 3.8–5.2)
SODIUM SERPL-SCNC: 140 MMOL/L (ref 133–144)
WBC # BLD AUTO: 7 10E9/L (ref 4–11)

## 2019-02-15 PROCEDURE — 99284 EMERGENCY DEPT VISIT MOD MDM: CPT

## 2019-02-15 PROCEDURE — 85025 COMPLETE CBC W/AUTO DIFF WBC: CPT | Performed by: EMERGENCY MEDICINE

## 2019-02-15 PROCEDURE — 93005 ELECTROCARDIOGRAM TRACING: CPT

## 2019-02-15 PROCEDURE — 0298T ZIO PATCH HOLTER ADULT PEDIATRIC GREATER THAN 48 HRS: CPT | Performed by: INTERNAL MEDICINE

## 2019-02-15 PROCEDURE — 80048 BASIC METABOLIC PNL TOTAL CA: CPT | Performed by: EMERGENCY MEDICINE

## 2019-02-15 PROCEDURE — 83735 ASSAY OF MAGNESIUM: CPT | Performed by: EMERGENCY MEDICINE

## 2019-02-15 PROCEDURE — 0296T ZIO PATCH HOLTER ADULT PEDIATRIC GREATER THAN 48 HRS: CPT

## 2019-02-15 PROCEDURE — 84702 CHORIONIC GONADOTROPIN TEST: CPT

## 2019-02-15 NOTE — ED TRIAGE NOTES
Patient states she had passing episodes over last two weeks. Patient states she had a concussion and thinks it might be related. Patient is worried sheis having seizures states she had 4 episodes over last 2 days. ABC intact alert and no distress.

## 2019-02-15 NOTE — ED PROVIDER NOTES
History     Chief Complaint:  Syncope     HPI   Yu Machado is a 28 year old female who presents to the emergency department today for evaluation of syncope. Patient states she had a head injury back in December and has been going through post concussion therapy. She additionally, had another head trauma when she fell down the stairs because her balance was still off from the concussion. She endorses having 2 CT of her head, both of them coming back negative. Today, patient presents stating that she has had multiple episode of syncope. She endorses that she will just be sitting there when her boyfriend notices that her eyes start roll in the back of her head and start to flutter. This has happened roughly 5 times. The patient reports that she doesn't usually feel the episodes coming on, however, yesterday she felt like something was happening. She endorses not remembering anything that happens once she wakes up. Additionally, the patient endorses having more repetitive motions. She reports that she still has intermittent headaches since her concussion along with some slight balance issues as well. Patient is supposed to have a follow up appointment at the TBI center on Monday with neurology. She denies any new recent trauma, or recent injury.    Allergies:  No Known Drug Allergies    Medications:    Desyrel  Atarax  Antivert  Remeron  Inderal    Carafate  Ultram    Past Medical History:    Chronic low back pain  Obesity    Past Surgical History:    Cholecystectomy  lumbar back surgery    Family History:    Mother: hypertension    Social History:  The patient was accompanied to the ED by herself.  Smoking Status: Current every day smoker  Smokeless Tobacco: Never Used  Alcohol Use: Positive  Drug Use: Negative  Marital Status:  Single     Review of Systems   Neurological: Positive for syncope.        Balance issues   All other systems reviewed and are negative.      Physical Exam     Patient Vitals for the  past 24 hrs:   BP Temp Temp src Pulse Heart Rate Resp SpO2 Weight   02/15/19 1500 107/77 -- -- 72 69 -- 98 % --   02/15/19 1445 120/75 -- -- 71 61 22 97 % --   02/15/19 1430 124/79 -- -- 65 63 24 95 % --   02/15/19 1415 128/78 -- -- 67 67 22 96 % --   02/15/19 1400 (!) 134/91 -- -- 68 61 22 97 % --   02/15/19 1345 129/80 -- -- 68 70 28 95 % --   02/15/19 1330 166/90 -- -- 65 64 24 96 % --   02/15/19 1315 (!) 184/171 -- -- 69 65 21 98 % --   02/15/19 1300 (!) 166/144 -- -- 64 68 17 96 % --   02/15/19 1245 (!) 157/139 -- -- 68 70 13 94 % --   02/15/19 1230 (!) 153/94 -- -- 71 69 23 95 % --   02/15/19 1220 (!) 162/120 -- -- -- 73 -- 93 % --   02/15/19 1215 (!) 162/120 -- -- 76 71 14 92 % --   02/15/19 1210 -- -- -- -- 71 24 96 % --   02/15/19 1200 (!) 147/115 -- -- 78 74 29 93 % --   02/15/19 1150 128/86 -- -- -- 68 13 97 % --   02/15/19 1140 (!) 137/91 -- -- 64 64 24 94 % --   02/15/19 1130 -- -- -- -- -- -- 93 % --   02/15/19 1119 139/90 97.4  F (36.3  C) Temporal 80 -- 20 96 % 113.4 kg (250 lb)       Physical Exam  General: Adult female sitting upright  Eyes: PERRL, Conjunctive within normal limits. EOMI. No nystagmus appreciated.  ENT: Moist mucous membranes, oropharynx clear.   Neck: No nystagmus.  CV: Normal S1S2, no murmur, rub or gallop. Regular rate and rhythm  Resp: Clear to auscultation bilaterally, no wheezes, rales or rhonchi. Normal respiratory effort.  GI: Abdomen is soft, nontender and nondistended. No palpable masses. No rebound or guarding.  MSK: No edema. Nontender. Normal active range of motion.  Skin: Warm and dry. No rashes or lesions or ecchymoses on visible skin.  Neuro: Alert and oriented. Responds appropriately to all questions and commands. Speech is normal. No facial asymmetry. No focal findings appreciated. Normal muscle tone.  Psych: Normal mood and affect. Pleasant.    Emergency Department Course     ECG:  ECG taken at 1128, ECG read at 1133  Normal sinus rhythm  Normal ECG  Rate 79 bpm.  IL interval 192 ms. QRS duration 94 ms. QT/QTc 412/472 ms. P-R-T axes 31 43 29.    Laboratory:  Laboratory findings were communicated with the patient who voiced understanding of the findings.    Magnesium: 2.2  ISTAT HCG: <5.0  CBC: WBC 7.0, HGB 13.3,   BMP: o/w WNL (Creatinine 0.70)    Emergency Department Course:    1123 Nursing notes and vitals reviewed.    1133 I performed an exam of the patient as documented above.     1145 IV was inserted and blood was drawn for laboratory testing, results above.    1400 I spoke with Dr. Navarro of the Neurology service regarding patient's presentation, findings, and plan of care. recommended outpatient sleep deprived EEG and possibly event monitor.    1519 Zio Patch Holter monitor placed.    Patient had an episode of change in mental status and fluttering of her eyelids, brief, not postictal, not consistent with seizure.    I personally reviewed the lab results with the patient and answered all related questions prior to discharge.    Impression & Plan      Medical Decision Making:  Yu Machado is a 28 year old female who presents to the emergency department today for evaluation of concerns of 5 events that could be consistent with syncope versus seizure. They would seem atypical for seizure. She does describe a significant postictal period. These are in the setting of recent head injury and ongoing post concussion syndrome. Head CT was not repeated as she has had 2 since December, first when she hit her head and second when she had second impact due to fall down stairs. Neither of these showed any acute intercranial pathology. She had no focal neurologic symptoms and is asymptomatic here. I discussed her care with Dr. Navarro of neurology and she will follow up next week with TBI clinic. Dr. Navarro does not seem indication for anti seizure medication at this time. She was given appropriate precautions recommendations with any driving or operating of  heavy machinery as she is not to do these until she has follow up. All questions were answered prior to discharge.    Just prior to discharge I was called into the patient's due to concerns of altered mental status. The patient eye lids were fluttering, she was occasionally yelping out with a loud shout. In less than 30 seconds she was alert. There was no postictal. I left the room for 30 seconds and came back in and she was texting on her phone. This does not seem consistent with the seizure like episode. She also did not have any change in her vital signs during the episode. I still think she is safe for discharge home at this time with now findings more consistent with possible pseudoseizure, less likely syncope, less likely dysrhythmia. Plan for EEG still appropriate.     Diagnosis:    ICD-10-CM    1. Syncope and collapse R55      Disposition:   The patient is discharged to home.    Discharge Medications:  No discharge medications    Scribe Disclosure:  Chary RODGERS, am serving as a scribe at 11:53 AM on 2/15/2019 to document services personally performed by Alfreda Law MD based on my observations and the provider's statements to me.    [facility] EMERGENCY DEPARTMENT       Alfreda Law MD  02/17/19 8930

## 2019-02-15 NOTE — PROGRESS NOTES
Placed a 3 day Zio patch.  Written and verbal instructions were given.  As I was leaving, the patient pushed her Zio patch button, I asked if she was alright.  She said she didn't feel good, then passed out. As she was passing out, her eyes were fluttering.  I immediately called for help, staff came in to assist the patient.

## 2019-02-15 NOTE — ED NOTES
"At 1500. Pt was getting ziopatch placed by cardiopulmonary. When she started having \"an episode\" Pt eyes were rolled back and she was responsive to verbal stimuli. Immediately after the \"episode\" pt was responding and conversing with staff and texting on her cellphone.    "

## 2019-02-15 NOTE — ED AVS SNAPSHOT
[facility] Emergency Department  [address]  [facility], [place] [address]  Phone:  [phone]  Fax:  [phone]                                    Yu Machado   MRN: [id number]    Department:  [facility] Emergency Department   Date of Visit:  2/15/2019           After Visit Summary Signature Page    I have received my discharge instructions, and my questions have been answered. I have discussed any challenges I see with this plan with the nurse or doctor.    ..........................................................................................................................................  Patient/Patient Representative Signature      ..........................................................................................................................................  Patient Representative Print Name and Relationship to Patient    ..................................................               ................................................  Date                                   Time    ..........................................................................................................................................  Reviewed by Signature/Title    ...................................................              ..............................................  Date                                               Time          22EPIC Rev 08/18

## 2019-05-22 NOTE — ADDENDUM NOTE
Encounter addended by: Africa Renteria, PT on: 5/22/2019 11:14 AM   Actions taken: Sign clinical note, Episode resolved

## 2019-05-22 NOTE — PROGRESS NOTES
Outpatient Physical Therapy Discharge Note     Patient: Yu Machado  : 1990    Beginning/End Dates of Reporting Period:  2019 to 2019    Referring Provider: Catrina Cole PA-C    Therapy Diagnosis: Decreased safety with functional mobility     Client Self Report: See eval    Objective Measurements: N/A    Outcome Measures (most recent score): N/A    Goals:  Goal Identifier 25' walk   Goal Description The patient will demonstrate consistent times with performance of normal 25' walk, 25' walk with HT, and 25' walk with cognitive challenge to demonstrate improved motor function with dual tasking and divided attention tasks to facilitate return to baseline level of function and engagement in motor task with added complexities.   Target Date 19   Date Met      Progress:     Goal Identifier DVA   Goal Description The patient will score within 2 lines of SVA with performance of DVA to demonstrate that her vestibular system is functioning optimally and is able to support gaze stability within a functional range.   Target Date 19   Date Met      Progress:     Goal Identifier CSA    Goal Description The patient will score 10/90 or less on CSA to demonstrate a significant improvement in concussion symptom severity and utilization of management techniques.   Target Date 19   Date Met      Progress:     Progress Toward Goals: Not assessed this period as patient did not return for any additional visits.    Plan:  Discharge from therapy.    Discharge:    Reason for Discharge: Patient did not return for any additional visits.    Equipment Issued: N/A    Discharge Plan: N/A

## 2020-10-27 NOTE — DISCHARGE INSTRUCTIONS
Discharge Instructions  Abdominal Pain    Abdominal pain (belly pain) can be caused by many things. Your evaluation today does not show the exact cause for your pain. Your provider today has decided that it is unlikely your pain is due to a life threatening problem, or a problem requiring surgery or hospital admission. Sometimes those problems cannot be found right away, so it is very important that you follow up as directed.  Sometimes only the changes which occur over time allow the cause of your pain to be found.    Generally, every Emergency Department visit should have a follow-up clinic visit with either a primary or a specialty clinic/provider. Please follow-up as instructed by your emergency provider today. With abdominal pain, we often recommend very close follow-up, such as the following day.    ADULTS:  Return to the Emergency Department right away if:      You get an oral temperature above 102oF or as directed by your provider.    You have blood in your stools. This may be bright red or appear as black, tarry stools.      You keep vomiting (throwing up) or cannot drink liquids.    You see blood when you vomit.     You cannot have a bowel movement or you cannot pass gas.    Your stomach gets bloated or bigger.    Your skin or the whites of your eyes look yellow.    You faint.    You have bloody, frequent or painful urination (peeing).    You have new symptoms or anything that worries you.    CHILDREN:  Return to the Emergency Department right away if your child has any of the above-listed symptoms or the following:      Pushes your hand away or screams/cries when his/her belly is touched.    You notice your child is very fussy or weak.    Your child is very tired and is too tired to eat or drink.    Your child is dehydrated.  Signs of dehydration can be:  o Significant change in the amount of wet diapers/urine.  o Your infant or child starts to have dry mouth and lips, or no saliva (spit) or  tears.    PREGNANT WOMEN:  Return to the Emergency Department right away if you have any of the above-listed symptoms or the following:      You have bleeding, leaking fluid or passing tissue from the vagina.    You have worse pain or cramping, or pain in your shoulder or back.    You have vomiting that will not stop.    You have a temperature of 100oF or more.    Your baby is not moving as much as usual.    You faint.    You get a bad headache with or without eye problems and abdominal pain.    You have a seizure.    You have unusual discharge from your vagina and abdominal pain.    Abdominal pain is pretty common during pregnancy.  Your pain may or may not be related to your pregnancy. You should follow-up closely with your OB provider so they can evaluate you and your baby.  Until you follow-up with your regular provider, do the following:       Avoid sex and do not put anything in your vagina.    Drink clear fluids.    Only take medications approved by your provider.    MORE INFORMATION:    Appendicitis:  A possible cause of abdominal pain in any person who still has their appendix is acute appendicitis. Appendicitis is often hard to diagnose.  Testing does not always rule out early appendicitis or other causes of abdominal pain. Close follow-up with your provider and re-evaluations may be needed to figure out the reason for your abdominal pain.    Follow-up:  It is very important that you make an appointment with your clinic and go to the appointment.  If you do not follow-up with your primary provider, it may result in missing an important development which could result in permanent injury or disability and/or lasting pain.  If there is any problem keeping your appointment, call your provider or return to the Emergency Department.    Medications:  Take your medications as directed by your provider today.  Before using over-the-counter medications, ask your provider and make sure to take the medications as  "directed.  If you have any questions about medications, ask your provider.    Diet:  Resume your normal diet as much as possible, but do not eat fried, fatty or spicy foods while you have pain.  Do not drink alcohol or have caffeine.  Do not smoke tobacco.    Probiotics: If you have been given an antibiotic, you may want to also take a probiotic pill or eat yogurt with live cultures. Probiotics have \"good bacteria\" to help your intestines stay healthy. Studies have shown that probiotics help prevent diarrhea (loose stools) and other intestine problems (including C. diff infection) when you take antibiotics. You can buy these without a prescription in the pharmacy section of the store.     If you were given a prescription for medicine here today, be sure to read all of the information (including the package insert) that comes with your prescription.  This will include important information about the medicine, its side effects, and any warnings that you need to know about.  The pharmacist who fills the prescription can provide more information and answer questions you may have about the medicine.  If you have questions or concerns that the pharmacist cannot address, please call or return to the Emergency Department.       Remember that you can always come back to the Emergency Department if you are not able to see your regular provider in the amount of time listed above, if you get any new symptoms, or if there is anything that worries you.    " chaplaincy/clergy/

## 2023-05-04 NOTE — DISCHARGE INSTRUCTIONS
Follow up with primary care provider      Discharge Instructions  Abdominal Pain    Abdominal pain (belly pain) can be caused by many things. Your evaluation today does not show the exact cause for your pain. Your provider today has decided that it is unlikely your pain is due to a life threatening problem, or a problem requiring surgery or hospital admission. Sometimes those problems cannot be found right away, so it is very important that you follow up as directed.  Sometimes only the changes which occur over time allow the cause of your pain to be found.    Generally, every Emergency Department visit should have a follow-up clinic visit with either a primary or a specialty clinic/provider. Please follow-up as instructed by your emergency provider today. With abdominal pain, we often recommend very close follow-up, such as the following day.    ADULTS:  Return to the Emergency Department right away if:      You get an oral temperature above 102oF or as directed by your provider.    You have blood in your stools. This may be bright red or appear as black, tarry stools.      You keep vomiting (throwing up) or cannot drink liquids.    You see blood when you vomit.     You cannot have a bowel movement or you cannot pass gas.    Your stomach gets bloated or bigger.    Your skin or the whites of your eyes look yellow.    You faint.    You have bloody, frequent or painful urination (peeing).    You have new symptoms or anything that worries you.    CHILDREN:  Return to the Emergency Department right away if your child has any of the above-listed symptoms or the following:      Pushes your hand away or screams/cries when his/her belly is touched.    You notice your child is very fussy or weak.    Your child is very tired and is too tired to eat or drink.    Your child is dehydrated.  Signs of dehydration can be:  o Significant change in the amount of wet diapers/urine.  o Your infant or child starts to have dry mouth and  You may continue to take 650 mg to 1000 mg of tylenol every 6-8 hours as needed for pain.    You can also ice/heat the area for additional antiinflammatory affects. Never apply ice/heat directly to the skin, make sure there is a towel or surface in between.    Follow up with occupational health for further worker's compensation paperwork.   lips, or no saliva (spit) or tears.    PREGNANT WOMEN:  Return to the Emergency Department right away if you have any of the above-listed symptoms or the following:      You have bleeding, leaking fluid or passing tissue from the vagina.    You have worse pain or cramping, or pain in your shoulder or back.    You have vomiting that will not stop.    You have a temperature of 100oF or more.    Your baby is not moving as much as usual.    You faint.    You get a bad headache with or without eye problems and abdominal pain.    You have a seizure.    You have unusual discharge from your vagina and abdominal pain.    Abdominal pain is pretty common during pregnancy.  Your pain may or may not be related to your pregnancy. You should follow-up closely with your OB provider so they can evaluate you and your baby.  Until you follow-up with your regular provider, do the following:       Avoid sex and do not put anything in your vagina.    Drink clear fluids.    Only take medications approved by your provider.    MORE INFORMATION:    Appendicitis:  A possible cause of abdominal pain in any person who still has their appendix is acute appendicitis. Appendicitis is often hard to diagnose.  Testing does not always rule out early appendicitis or other causes of abdominal pain. Close follow-up with your provider and re-evaluations may be needed to figure out the reason for your abdominal pain.    Follow-up:  It is very important that you make an appointment with your clinic and go to the appointment.  If you do not follow-up with your primary provider, it may result in missing an important development which could result in permanent injury or disability and/or lasting pain.  If there is any problem keeping your appointment, call your provider or return to the Emergency Department.    Medications:  Take your medications as directed by your provider today.  Before using over-the-counter medications, ask your provider and make sure to  "take the medications as directed.  If you have any questions about medications, ask your provider.    Diet:  Resume your normal diet as much as possible, but do not eat fried, fatty or spicy foods while you have pain.  Do not drink alcohol or have caffeine.  Do not smoke tobacco.    Probiotics: If you have been given an antibiotic, you may want to also take a probiotic pill or eat yogurt with live cultures. Probiotics have \"good bacteria\" to help your intestines stay healthy. Studies have shown that probiotics help prevent diarrhea (loose stools) and other intestine problems (including C. diff infection) when you take antibiotics. You can buy these without a prescription in the pharmacy section of the store.     If you were given a prescription for medicine here today, be sure to read all of the information (including the package insert) that comes with your prescription.  This will include important information about the medicine, its side effects, and any warnings that you need to know about.  The pharmacist who fills the prescription can provide more information and answer questions you may have about the medicine.  If you have questions or concerns that the pharmacist cannot address, please call or return to the Emergency Department.       Remember that you can always come back to the Emergency Department if you are not able to see your regular provider in the amount of time listed above, if you get any new symptoms, or if there is anything that worries you.    "